# Patient Record
Sex: MALE | Race: WHITE | HISPANIC OR LATINO | URBAN - METROPOLITAN AREA
[De-identification: names, ages, dates, MRNs, and addresses within clinical notes are randomized per-mention and may not be internally consistent; named-entity substitution may affect disease eponyms.]

---

## 2018-05-08 ENCOUNTER — OFFICE (AMBULATORY)
Dept: URBAN - METROPOLITAN AREA CLINIC 75 | Facility: CLINIC | Age: 53
End: 2018-05-08

## 2018-05-08 VITALS
SYSTOLIC BLOOD PRESSURE: 120 MMHG | DIASTOLIC BLOOD PRESSURE: 80 MMHG | HEIGHT: 70 IN | WEIGHT: 218 LBS | HEART RATE: 86 BPM

## 2018-05-08 DIAGNOSIS — K21.9 GASTRO-ESOPHAGEAL REFLUX DISEASE WITHOUT ESOPHAGITIS: ICD-10-CM

## 2018-05-08 DIAGNOSIS — R10.12 LEFT UPPER QUADRANT PAIN: ICD-10-CM

## 2018-05-08 DIAGNOSIS — R09.89 OTHER SPECIFIED SYMPTOMS AND SIGNS INVOLVING THE CIRCULATORY: ICD-10-CM

## 2018-05-08 DIAGNOSIS — Z12.11 ENCOUNTER FOR SCREENING FOR MALIGNANT NEOPLASM OF COLON: ICD-10-CM

## 2018-05-08 PROCEDURE — 99204 OFFICE O/P NEW MOD 45 MIN: CPT | Performed by: INTERNAL MEDICINE

## 2018-05-08 NOTE — SERVICENOTES
The above note was scribed by Cesia Dixon PA-C. Dr. Kline saw this patient and examined this patient while in the office.

## 2018-05-08 NOTE — SERVICEHPINOTES
Thank you for allowing me to participate in the care of this patient.   MARICHUY LUONG  presents in consultation for   reflux symptoms that started years ago. He has symptoms 3 times a month. He reports feeling acid come up in the middle of the night and he has had several choking episodes recently. He feels like food/fluid will go down the "wrong pipe." He takes Omeprazole 40mg QD for this. He also c/o left periumbilical pain that can happen with sitting. It feels like he tore a muscle. Prolonged sitting makes it worse. This has been occurring for years. He is also due a routine colonoscopy. No FHCC, no colon polyps.    BMP Gluc 250, Prot 30, Urobilinogen 2.0, A1C 9.0 CMP gluc 152, otherwise normal

## 2018-06-26 VITALS
SYSTOLIC BLOOD PRESSURE: 116 MMHG | DIASTOLIC BLOOD PRESSURE: 41 MMHG | RESPIRATION RATE: 16 BRPM | HEIGHT: 70 IN | OXYGEN SATURATION: 90 % | SYSTOLIC BLOOD PRESSURE: 142 MMHG | OXYGEN SATURATION: 97 % | HEART RATE: 77 BPM | SYSTOLIC BLOOD PRESSURE: 141 MMHG | TEMPERATURE: 97.5 F | SYSTOLIC BLOOD PRESSURE: 137 MMHG | OXYGEN SATURATION: 89 % | RESPIRATION RATE: 10 BRPM | HEART RATE: 81 BPM | RESPIRATION RATE: 20 BRPM | DIASTOLIC BLOOD PRESSURE: 87 MMHG | SYSTOLIC BLOOD PRESSURE: 113 MMHG | DIASTOLIC BLOOD PRESSURE: 102 MMHG | SYSTOLIC BLOOD PRESSURE: 151 MMHG | DIASTOLIC BLOOD PRESSURE: 81 MMHG | DIASTOLIC BLOOD PRESSURE: 69 MMHG | SYSTOLIC BLOOD PRESSURE: 152 MMHG | RESPIRATION RATE: 19 BRPM | OXYGEN SATURATION: 92 % | SYSTOLIC BLOOD PRESSURE: 121 MMHG | RESPIRATION RATE: 23 BRPM | DIASTOLIC BLOOD PRESSURE: 74 MMHG | HEART RATE: 73 BPM | HEART RATE: 74 BPM | HEART RATE: 75 BPM | DIASTOLIC BLOOD PRESSURE: 84 MMHG | RESPIRATION RATE: 27 BRPM | OXYGEN SATURATION: 93 % | HEART RATE: 79 BPM | DIASTOLIC BLOOD PRESSURE: 88 MMHG | SYSTOLIC BLOOD PRESSURE: 132 MMHG | SYSTOLIC BLOOD PRESSURE: 119 MMHG | DIASTOLIC BLOOD PRESSURE: 76 MMHG | DIASTOLIC BLOOD PRESSURE: 92 MMHG | HEART RATE: 76 BPM | HEART RATE: 87 BPM | WEIGHT: 218 LBS | OXYGEN SATURATION: 99 % | SYSTOLIC BLOOD PRESSURE: 148 MMHG | OXYGEN SATURATION: 88 % | OXYGEN SATURATION: 95 % | DIASTOLIC BLOOD PRESSURE: 103 MMHG | SYSTOLIC BLOOD PRESSURE: 120 MMHG | RESPIRATION RATE: 14 BRPM

## 2018-06-28 ENCOUNTER — AMBULATORY SURGICAL CENTER (AMBULATORY)
Dept: URBAN - METROPOLITAN AREA SURGERY 17 | Facility: SURGERY | Age: 53
End: 2018-06-28
Payer: COMMERCIAL

## 2018-06-28 ENCOUNTER — OFFICE (AMBULATORY)
Dept: URBAN - METROPOLITAN AREA PATHOLOGY 4 | Facility: PATHOLOGY | Age: 53
End: 2018-06-28
Payer: COMMERCIAL

## 2018-06-28 DIAGNOSIS — D12.5 BENIGN NEOPLASM OF SIGMOID COLON: ICD-10-CM

## 2018-06-28 DIAGNOSIS — D12.3 BENIGN NEOPLASM OF TRANSVERSE COLON: ICD-10-CM

## 2018-06-28 DIAGNOSIS — R10.12 LEFT UPPER QUADRANT PAIN: ICD-10-CM

## 2018-06-28 DIAGNOSIS — K22.10 ULCER OF ESOPHAGUS WITHOUT BLEEDING: ICD-10-CM

## 2018-06-28 DIAGNOSIS — Z12.11 ENCOUNTER FOR SCREENING FOR MALIGNANT NEOPLASM OF COLON: ICD-10-CM

## 2018-06-28 DIAGNOSIS — R09.89 OTHER SPECIFIED SYMPTOMS AND SIGNS INVOLVING THE CIRCULATORY: ICD-10-CM

## 2018-06-28 DIAGNOSIS — K21.9 GASTRO-ESOPHAGEAL REFLUX DISEASE WITHOUT ESOPHAGITIS: ICD-10-CM

## 2018-06-28 LAB
GI HISTOLOGY: A. SELECT: (no result)
GI HISTOLOGY: B. SELECT: (no result)
GI HISTOLOGY: C. UNSPECIFIED: (no result)
GI HISTOLOGY: D. UNSPECIFIED: (no result)
GI HISTOLOGY: PDF REPORT: (no result)

## 2018-06-28 PROCEDURE — 43239 EGD BIOPSY SINGLE/MULTIPLE: CPT | Performed by: INTERNAL MEDICINE

## 2018-06-28 PROCEDURE — 88312 SPECIAL STAINS GROUP 1: CPT | Performed by: INTERNAL MEDICINE

## 2018-06-28 PROCEDURE — 45380 COLONOSCOPY AND BIOPSY: CPT | Mod: 33 | Performed by: INTERNAL MEDICINE

## 2018-06-28 PROCEDURE — 88305 TISSUE EXAM BY PATHOLOGIST: CPT | Mod: 33 | Performed by: INTERNAL MEDICINE

## 2018-06-28 RX ORDER — OMEPRAZOLE 40 MG/1
CAPSULE, DELAYED RELEASE ORAL
Qty: 0 | Refills: 0 | Status: COMPLETED
End: 2021-06-17

## 2018-06-28 RX ADMIN — PROPOFOL 50 MG: 10 INJECTION, EMULSION INTRAVENOUS at 10:55

## 2018-06-28 RX ADMIN — LIDOCAINE HYDROCHLORIDE 25 MG: 10 INJECTION, SOLUTION EPIDURAL; INFILTRATION; INTRACAUDAL; PERINEURAL at 10:35

## 2018-06-28 RX ADMIN — PROPOFOL 50 MG: 10 INJECTION, EMULSION INTRAVENOUS at 10:44

## 2018-06-28 RX ADMIN — PROPOFOL 100 MG: 10 INJECTION, EMULSION INTRAVENOUS at 10:35

## 2018-06-28 RX ADMIN — PROPOFOL 50 MG: 10 INJECTION, EMULSION INTRAVENOUS at 10:53

## 2018-06-28 RX ADMIN — PROPOFOL 50 MG: 10 INJECTION, EMULSION INTRAVENOUS at 10:39

## 2018-08-13 ENCOUNTER — OFFICE (AMBULATORY)
Dept: URBAN - METROPOLITAN AREA CLINIC 75 | Facility: CLINIC | Age: 53
End: 2018-08-13

## 2018-08-13 VITALS
WEIGHT: 217 LBS | HEART RATE: 72 BPM | SYSTOLIC BLOOD PRESSURE: 130 MMHG | DIASTOLIC BLOOD PRESSURE: 80 MMHG | HEIGHT: 70 IN

## 2018-08-13 DIAGNOSIS — K22.70 BARRETT'S ESOPHAGUS WITHOUT DYSPLASIA: ICD-10-CM

## 2018-08-13 DIAGNOSIS — K22.10 ULCER OF ESOPHAGUS WITHOUT BLEEDING: ICD-10-CM

## 2018-08-13 DIAGNOSIS — K21.9 GASTRO-ESOPHAGEAL REFLUX DISEASE WITHOUT ESOPHAGITIS: ICD-10-CM

## 2018-08-13 DIAGNOSIS — Z86.010 PERSONAL HISTORY OF COLONIC POLYPS: ICD-10-CM

## 2018-08-13 PROCEDURE — 99213 OFFICE O/P EST LOW 20 MIN: CPT

## 2019-01-02 ENCOUNTER — OFFICE (AMBULATORY)
Dept: URBAN - METROPOLITAN AREA CLINIC 75 | Facility: CLINIC | Age: 54
End: 2019-01-02

## 2019-01-02 VITALS
WEIGHT: 215 LBS | SYSTOLIC BLOOD PRESSURE: 142 MMHG | DIASTOLIC BLOOD PRESSURE: 90 MMHG | HEIGHT: 70 IN | HEART RATE: 84 BPM

## 2019-01-02 DIAGNOSIS — K21.9 GASTRO-ESOPHAGEAL REFLUX DISEASE WITHOUT ESOPHAGITIS: ICD-10-CM

## 2019-01-02 DIAGNOSIS — B37.0 CANDIDAL STOMATITIS: ICD-10-CM

## 2019-01-02 DIAGNOSIS — R11.0 NAUSEA: ICD-10-CM

## 2019-01-02 DIAGNOSIS — K22.70 BARRETT'S ESOPHAGUS WITHOUT DYSPLASIA: ICD-10-CM

## 2019-01-02 PROCEDURE — 99214 OFFICE O/P EST MOD 30 MIN: CPT

## 2019-01-02 RX ORDER — NYSTATIN 100000 [USP'U]/ML
6000 SUSPENSION ORAL
Qty: 900 | Refills: 1 | Status: COMPLETED
Start: 2019-01-02 | End: 2021-06-17

## 2021-06-17 ENCOUNTER — OFFICE (AMBULATORY)
Dept: URBAN - METROPOLITAN AREA CLINIC 75 | Facility: CLINIC | Age: 56
End: 2021-06-17

## 2021-06-17 VITALS — SYSTOLIC BLOOD PRESSURE: 122 MMHG | WEIGHT: 191 LBS | DIASTOLIC BLOOD PRESSURE: 78 MMHG | HEIGHT: 69 IN

## 2021-06-17 DIAGNOSIS — Z86.010 PERSONAL HISTORY OF COLONIC POLYPS: ICD-10-CM

## 2021-06-17 DIAGNOSIS — R13.10 DYSPHAGIA, UNSPECIFIED: ICD-10-CM

## 2021-06-17 DIAGNOSIS — Z12.11 ENCOUNTER FOR SCREENING FOR MALIGNANT NEOPLASM OF COLON: ICD-10-CM

## 2021-06-17 DIAGNOSIS — K21.9 GASTRO-ESOPHAGEAL REFLUX DISEASE WITHOUT ESOPHAGITIS: ICD-10-CM

## 2021-06-17 DIAGNOSIS — K22.70 BARRETT'S ESOPHAGUS WITHOUT DYSPLASIA: ICD-10-CM

## 2021-06-17 PROCEDURE — 99214 OFFICE O/P EST MOD 30 MIN: CPT | Performed by: NURSE PRACTITIONER

## 2021-06-17 RX ORDER — OMEPRAZOLE 40 MG/1
40 CAPSULE, DELAYED RELEASE ORAL
Qty: 180 | Refills: 3 | Status: COMPLETED
Start: 2020-09-17 | End: 2023-01-23

## 2023-01-23 ENCOUNTER — OFFICE (AMBULATORY)
Dept: URBAN - METROPOLITAN AREA CLINIC 76 | Facility: CLINIC | Age: 58
End: 2023-01-23

## 2023-01-23 VITALS
HEIGHT: 69 IN | HEART RATE: 85 BPM | OXYGEN SATURATION: 96 % | DIASTOLIC BLOOD PRESSURE: 81 MMHG | WEIGHT: 202 LBS | SYSTOLIC BLOOD PRESSURE: 138 MMHG

## 2023-01-23 DIAGNOSIS — K21.9 GASTRO-ESOPHAGEAL REFLUX DISEASE WITHOUT ESOPHAGITIS: ICD-10-CM

## 2023-01-23 DIAGNOSIS — K22.70 BARRETT'S ESOPHAGUS WITHOUT DYSPLASIA: ICD-10-CM

## 2023-01-23 DIAGNOSIS — R13.10 DYSPHAGIA, UNSPECIFIED: ICD-10-CM

## 2023-01-23 DIAGNOSIS — K22.10 ULCER OF ESOPHAGUS WITHOUT BLEEDING: ICD-10-CM

## 2023-01-23 DIAGNOSIS — Z86.010 PERSONAL HISTORY OF COLONIC POLYPS: ICD-10-CM

## 2023-01-23 PROCEDURE — 99214 OFFICE O/P EST MOD 30 MIN: CPT | Performed by: NURSE PRACTITIONER

## 2023-03-07 VITALS
SYSTOLIC BLOOD PRESSURE: 109 MMHG | DIASTOLIC BLOOD PRESSURE: 82 MMHG | DIASTOLIC BLOOD PRESSURE: 81 MMHG | WEIGHT: 187 LBS | SYSTOLIC BLOOD PRESSURE: 123 MMHG | SYSTOLIC BLOOD PRESSURE: 119 MMHG | DIASTOLIC BLOOD PRESSURE: 75 MMHG | HEART RATE: 81 BPM | OXYGEN SATURATION: 93 % | OXYGEN SATURATION: 97 % | RESPIRATION RATE: 16 BRPM | HEART RATE: 85 BPM | SYSTOLIC BLOOD PRESSURE: 117 MMHG | SYSTOLIC BLOOD PRESSURE: 118 MMHG | HEART RATE: 77 BPM | DIASTOLIC BLOOD PRESSURE: 78 MMHG | HEART RATE: 80 BPM | SYSTOLIC BLOOD PRESSURE: 110 MMHG | SYSTOLIC BLOOD PRESSURE: 131 MMHG | OXYGEN SATURATION: 96 % | HEART RATE: 68 BPM | DIASTOLIC BLOOD PRESSURE: 88 MMHG | DIASTOLIC BLOOD PRESSURE: 74 MMHG | SYSTOLIC BLOOD PRESSURE: 130 MMHG | RESPIRATION RATE: 18 BRPM | DIASTOLIC BLOOD PRESSURE: 73 MMHG | SYSTOLIC BLOOD PRESSURE: 114 MMHG | RESPIRATION RATE: 21 BRPM | HEART RATE: 76 BPM | RESPIRATION RATE: 15 BRPM | DIASTOLIC BLOOD PRESSURE: 65 MMHG | DIASTOLIC BLOOD PRESSURE: 84 MMHG | DIASTOLIC BLOOD PRESSURE: 72 MMHG | RESPIRATION RATE: 12 BRPM | OXYGEN SATURATION: 99 % | SYSTOLIC BLOOD PRESSURE: 146 MMHG | HEART RATE: 72 BPM | DIASTOLIC BLOOD PRESSURE: 89 MMHG | SYSTOLIC BLOOD PRESSURE: 126 MMHG | HEART RATE: 87 BPM | HEIGHT: 69 IN | SYSTOLIC BLOOD PRESSURE: 125 MMHG | RESPIRATION RATE: 13 BRPM | HEART RATE: 79 BPM | SYSTOLIC BLOOD PRESSURE: 115 MMHG | OXYGEN SATURATION: 98 % | HEART RATE: 73 BPM | DIASTOLIC BLOOD PRESSURE: 85 MMHG | SYSTOLIC BLOOD PRESSURE: 102 MMHG | DIASTOLIC BLOOD PRESSURE: 86 MMHG | RESPIRATION RATE: 14 BRPM | HEART RATE: 75 BPM | TEMPERATURE: 97.2 F | RESPIRATION RATE: 9 BRPM | RESPIRATION RATE: 17 BRPM | DIASTOLIC BLOOD PRESSURE: 68 MMHG | TEMPERATURE: 97 F | SYSTOLIC BLOOD PRESSURE: 148 MMHG | SYSTOLIC BLOOD PRESSURE: 95 MMHG

## 2023-03-10 ENCOUNTER — OFFICE (AMBULATORY)
Dept: URBAN - METROPOLITAN AREA PATHOLOGY 4 | Facility: PATHOLOGY | Age: 58
End: 2023-03-10
Payer: COMMERCIAL

## 2023-03-10 ENCOUNTER — AMBULATORY SURGICAL CENTER (AMBULATORY)
Dept: URBAN - METROPOLITAN AREA SURGERY 17 | Facility: SURGERY | Age: 58
End: 2023-03-10
Payer: COMMERCIAL

## 2023-03-10 DIAGNOSIS — K62.1 RECTAL POLYP: ICD-10-CM

## 2023-03-10 DIAGNOSIS — K22.9 DISEASE OF ESOPHAGUS, UNSPECIFIED: ICD-10-CM

## 2023-03-10 DIAGNOSIS — K22.70 BARRETT'S ESOPHAGUS WITHOUT DYSPLASIA: ICD-10-CM

## 2023-03-10 DIAGNOSIS — K63.5 POLYP OF COLON: ICD-10-CM

## 2023-03-10 DIAGNOSIS — Z86.010 PERSONAL HISTORY OF COLONIC POLYPS: ICD-10-CM

## 2023-03-10 DIAGNOSIS — K22.89 OTHER SPECIFIED DISEASE OF ESOPHAGUS: ICD-10-CM

## 2023-03-10 DIAGNOSIS — D12.0 BENIGN NEOPLASM OF CECUM: ICD-10-CM

## 2023-03-10 DIAGNOSIS — K20.80 OTHER ESOPHAGITIS WITHOUT BLEEDING: ICD-10-CM

## 2023-03-10 DIAGNOSIS — K64.8 OTHER HEMORRHOIDS: ICD-10-CM

## 2023-03-10 DIAGNOSIS — K21.9 GASTRO-ESOPHAGEAL REFLUX DISEASE WITHOUT ESOPHAGITIS: ICD-10-CM

## 2023-03-10 LAB
GI HISTOLOGY: A. UNSPECIFIED: (no result)
GI HISTOLOGY: B. UNSPECIFIED: (no result)
GI HISTOLOGY: C. UNSPECIFIED: (no result)
GI HISTOLOGY: D. UNSPECIFIED: (no result)
GI HISTOLOGY: E. UNSPECIFIED: (no result)
GI HISTOLOGY: F. UNSPECIFIED: (no result)
GI HISTOLOGY: PDF REPORT: (no result)

## 2023-03-10 PROCEDURE — 43239 EGD BIOPSY SINGLE/MULTIPLE: CPT | Performed by: INTERNAL MEDICINE

## 2023-03-10 PROCEDURE — 88305 TISSUE EXAM BY PATHOLOGIST: CPT | Performed by: INTERNAL MEDICINE

## 2023-03-10 PROCEDURE — 45385 COLONOSCOPY W/LESION REMOVAL: CPT | Mod: 33 | Performed by: INTERNAL MEDICINE

## 2023-03-10 NOTE — SERVICEHPINOTES
MARICHUY LUONG  is a  57  male   who presents today for a  EGD-Colonoscopy   for   the indications listed below. The updated Patient Profile was reviewed prior to the procedure, in conjunction with the Physical Exam, including medical conditions, surgical procedures, medications, allergies, family history and social history. See Physical Exam time stamp below for date and time of HPI completion.Pre-operatively, I reviewed the indication(s) for the procedure, the risks of the procedure [including but not limited to: unexpected bleeding possibly requiring hospitalization and/or unplanned repeat procedures, perforation possibly requiring surgical treatment, missed lesions and complications of sedation/MAC (also explained by anesthesia staff)]. I have evaluated the patient for risks associated with the planned anesthesia and the procedure to be performed and find the patient an acceptable candidate for IV sedation.Multiple opportunities were provided for any questions or concerns, and all questions were answered satisfactorily before any anesthesia was administered. We will proceed with the planned procedure.br

## 2023-04-24 ENCOUNTER — OFFICE (AMBULATORY)
Dept: URBAN - METROPOLITAN AREA CLINIC 76 | Facility: CLINIC | Age: 58
End: 2023-04-24

## 2023-04-24 VITALS
HEIGHT: 69 IN | OXYGEN SATURATION: 99 % | DIASTOLIC BLOOD PRESSURE: 86 MMHG | SYSTOLIC BLOOD PRESSURE: 140 MMHG | HEART RATE: 78 BPM | WEIGHT: 200 LBS

## 2023-04-24 DIAGNOSIS — Z87.891 PERSONAL HISTORY OF NICOTINE DEPENDENCE: ICD-10-CM

## 2023-04-24 DIAGNOSIS — K21.9 GASTRO-ESOPHAGEAL REFLUX DISEASE WITHOUT ESOPHAGITIS: ICD-10-CM

## 2023-04-24 DIAGNOSIS — Z86.010 PERSONAL HISTORY OF COLONIC POLYPS: ICD-10-CM

## 2023-04-24 DIAGNOSIS — K22.70 BARRETT'S ESOPHAGUS WITHOUT DYSPLASIA: ICD-10-CM

## 2023-04-24 DIAGNOSIS — K22.9 DISEASE OF ESOPHAGUS, UNSPECIFIED: ICD-10-CM

## 2023-04-24 DIAGNOSIS — F10.10 ALCOHOL ABUSE, UNCOMPLICATED: ICD-10-CM

## 2023-04-24 PROCEDURE — 99214 OFFICE O/P EST MOD 30 MIN: CPT | Performed by: NURSE PRACTITIONER

## 2023-04-24 RX ORDER — OMEPRAZOLE 40 MG/1
CAPSULE, DELAYED RELEASE ORAL
Qty: 90 | Refills: 2 | Status: ACTIVE

## 2023-10-12 VITALS
WEIGHT: 200 LBS | SYSTOLIC BLOOD PRESSURE: 145 MMHG | DIASTOLIC BLOOD PRESSURE: 91 MMHG | TEMPERATURE: 97 F | RESPIRATION RATE: 16 BRPM | DIASTOLIC BLOOD PRESSURE: 98 MMHG | DIASTOLIC BLOOD PRESSURE: 83 MMHG | RESPIRATION RATE: 9 BRPM | TEMPERATURE: 97.9 F | OXYGEN SATURATION: 95 % | SYSTOLIC BLOOD PRESSURE: 143 MMHG | OXYGEN SATURATION: 88 % | HEIGHT: 69 IN | DIASTOLIC BLOOD PRESSURE: 87 MMHG | DIASTOLIC BLOOD PRESSURE: 100 MMHG | HEART RATE: 77 BPM | OXYGEN SATURATION: 97 % | HEART RATE: 80 BPM | HEART RATE: 78 BPM | HEART RATE: 74 BPM | RESPIRATION RATE: 18 BRPM | SYSTOLIC BLOOD PRESSURE: 137 MMHG | RESPIRATION RATE: 15 BRPM | SYSTOLIC BLOOD PRESSURE: 142 MMHG | HEART RATE: 83 BPM | OXYGEN SATURATION: 98 % | SYSTOLIC BLOOD PRESSURE: 150 MMHG

## 2023-10-17 ENCOUNTER — AMBULATORY SURGICAL CENTER (AMBULATORY)
Dept: URBAN - METROPOLITAN AREA SURGERY 17 | Facility: SURGERY | Age: 58
End: 2023-10-17
Payer: COMMERCIAL

## 2023-10-17 DIAGNOSIS — Z53.8 PROCEDURE AND TREATMENT NOT CARRIED OUT FOR OTHER REASONS: ICD-10-CM

## 2023-10-17 DIAGNOSIS — K22.9 DISEASE OF ESOPHAGUS, UNSPECIFIED: ICD-10-CM

## 2023-10-17 DIAGNOSIS — K31.89 OTHER DISEASES OF STOMACH AND DUODENUM: ICD-10-CM

## 2023-10-17 PROCEDURE — 43235 EGD DIAGNOSTIC BRUSH WASH: CPT | Mod: 52 | Performed by: INTERNAL MEDICINE

## 2023-11-06 ENCOUNTER — OFFICE (AMBULATORY)
Dept: URBAN - METROPOLITAN AREA CLINIC 76 | Facility: CLINIC | Age: 58
End: 2023-11-06

## 2023-11-06 VITALS
HEIGHT: 69 IN | HEART RATE: 90 BPM | WEIGHT: 201 LBS | SYSTOLIC BLOOD PRESSURE: 147 MMHG | DIASTOLIC BLOOD PRESSURE: 81 MMHG | DIASTOLIC BLOOD PRESSURE: 84 MMHG | SYSTOLIC BLOOD PRESSURE: 122 MMHG | OXYGEN SATURATION: 97 %

## 2023-11-06 DIAGNOSIS — Z86.010 PERSONAL HISTORY OF COLONIC POLYPS: ICD-10-CM

## 2023-11-06 DIAGNOSIS — K21.9 GASTRO-ESOPHAGEAL REFLUX DISEASE WITHOUT ESOPHAGITIS: ICD-10-CM

## 2023-11-06 DIAGNOSIS — K22.9 DISEASE OF ESOPHAGUS, UNSPECIFIED: ICD-10-CM

## 2023-11-06 DIAGNOSIS — K22.70 BARRETT'S ESOPHAGUS WITHOUT DYSPLASIA: ICD-10-CM

## 2023-11-06 PROBLEM — K22.10 ULCER OF ESOPHAGUS WITHOUT BLEEDING: Status: ACTIVE | Noted: 2018-06-28

## 2023-11-06 PROBLEM — K22.89 OTHER SPECIFIED DISEASE OF ESOPHAGUS: Status: ACTIVE | Noted: 2023-03-10

## 2023-11-06 PROBLEM — D12.3 BENIGN NEOPLASM OF TRANSVERSE COLON: Status: ACTIVE | Noted: 2018-06-28

## 2023-11-06 PROBLEM — D12.5 BENIGN NEOPLASM OF SIGMOID COLON: Status: ACTIVE | Noted: 2018-06-28

## 2023-11-06 PROBLEM — K62.1 RECTAL POLYP: Status: ACTIVE | Noted: 2023-03-10

## 2023-11-06 PROBLEM — K63.5 POLYP OF COLON: Status: ACTIVE | Noted: 2023-03-10

## 2023-11-06 PROCEDURE — 99214 OFFICE O/P EST MOD 30 MIN: CPT | Performed by: NURSE PRACTITIONER

## 2023-11-08 ENCOUNTER — TELEPHONE (OUTPATIENT)
Dept: ORTHOPEDIC SURGERY | Facility: HOSPITAL | Age: 58
End: 2023-11-08
Payer: COMMERCIAL

## 2023-11-08 ENCOUNTER — HOSPITAL ENCOUNTER (OUTPATIENT)
Dept: GENERAL RADIOLOGY | Facility: HOSPITAL | Age: 58
Discharge: HOME | End: 2023-11-08
Payer: COMMERCIAL

## 2023-11-08 ENCOUNTER — PRE-ADMISSION TESTING (OUTPATIENT)
Dept: PREADMISSION TESTING | Facility: HOSPITAL | Age: 58
End: 2023-11-08
Payer: COMMERCIAL

## 2023-11-08 VITALS
WEIGHT: 204 LBS | HEART RATE: 96 BPM | BODY MASS INDEX: 29.2 KG/M2 | SYSTOLIC BLOOD PRESSURE: 145 MMHG | RESPIRATION RATE: 20 BRPM | DIASTOLIC BLOOD PRESSURE: 89 MMHG | OXYGEN SATURATION: 97 % | HEIGHT: 70 IN | TEMPERATURE: 97.3 F

## 2023-11-08 LAB
ALBUMIN SERPL-MCNC: 4.4 G/DL (ref 3.5–5.2)
ALBUMIN/GLOB SERPL: 2 G/DL
ALP SERPL-CCNC: 95 U/L (ref 39–117)
ALT SERPL W P-5'-P-CCNC: 16 U/L (ref 1–41)
ANION GAP SERPL CALCULATED.3IONS-SCNC: 11.2 MMOL/L (ref 5–15)
APTT PPP: 33.8 SECONDS (ref 22.7–35.4)
AST SERPL-CCNC: 16 U/L (ref 1–40)
BILIRUB SERPL-MCNC: 0.8 MG/DL (ref 0–1.2)
BILIRUB UR QL STRIP: NEGATIVE
BUN SERPL-MCNC: 15 MG/DL (ref 8–23)
BUN/CREAT SERPL: 16.7 (ref 7–25)
CALCIUM SPEC-SCNC: 9.1 MG/DL (ref 8.6–10.5)
CHLORIDE SERPL-SCNC: 105 MMOL/L (ref 98–107)
CLARITY UR: CLEAR
CO2 SERPL-SCNC: 21.8 MMOL/L (ref 22–29)
COLOR UR: YELLOW
CREAT SERPL-MCNC: 0.9 MG/DL (ref 0.76–1.27)
DEPRECATED RDW RBC AUTO: 48.6 FL (ref 37–54)
EGFRCR SERPLBLD CKD-EPI 2021: 97.8 ML/MIN/1.73
ERYTHROCYTE [DISTWIDTH] IN BLOOD BY AUTOMATED COUNT: 13.5 % (ref 12.3–15.4)
GLOBULIN UR ELPH-MCNC: 2.2 GM/DL
GLUCOSE SERPL-MCNC: 218 MG/DL (ref 65–99)
GLUCOSE UR STRIP-MCNC: ABNORMAL MG/DL
HCT VFR BLD AUTO: 45.9 % (ref 37.5–51)
HGB BLD-MCNC: 16.1 G/DL (ref 13–17.7)
HGB UR QL STRIP.AUTO: NEGATIVE
INR PPP: 1.01 (ref 0.9–1.1)
KETONES UR QL STRIP: NEGATIVE
LEUKOCYTE ESTERASE UR QL STRIP.AUTO: NEGATIVE
MCH RBC QN AUTO: 34.6 PG (ref 26.6–33)
MCHC RBC AUTO-ENTMCNC: 35.1 G/DL (ref 31.5–35.7)
MCV RBC AUTO: 98.7 FL (ref 79–97)
NITRITE UR QL STRIP: NEGATIVE
PH UR STRIP.AUTO: 5.5 [PH] (ref 5–8)
PLATELET # BLD AUTO: 294 10*3/MM3 (ref 140–450)
PMV BLD AUTO: 9.1 FL (ref 6–12)
POTASSIUM SERPL-SCNC: 4 MMOL/L (ref 3.5–5.2)
PROT SERPL-MCNC: 6.6 G/DL (ref 6–8.5)
PROT UR QL STRIP: NEGATIVE
PROTHROMBIN TIME: 13.4 SECONDS (ref 11.7–14.2)
RBC # BLD AUTO: 4.65 10*6/MM3 (ref 4.14–5.8)
SODIUM SERPL-SCNC: 138 MMOL/L (ref 136–145)
SP GR UR STRIP: >=1.03 (ref 1–1.03)
UROBILINOGEN UR QL STRIP: ABNORMAL
WBC NRBC COR # BLD: 6.4 10*3/MM3 (ref 3.4–10.8)

## 2023-11-08 PROCEDURE — 93005 ELECTROCARDIOGRAM TRACING: CPT

## 2023-11-08 PROCEDURE — 80053 COMPREHEN METABOLIC PANEL: CPT

## 2023-11-08 PROCEDURE — 85610 PROTHROMBIN TIME: CPT

## 2023-11-08 PROCEDURE — 36415 COLL VENOUS BLD VENIPUNCTURE: CPT

## 2023-11-08 PROCEDURE — 85730 THROMBOPLASTIN TIME PARTIAL: CPT

## 2023-11-08 PROCEDURE — 81003 URINALYSIS AUTO W/O SCOPE: CPT

## 2023-11-08 PROCEDURE — 71046 X-RAY EXAM CHEST 2 VIEWS: CPT

## 2023-11-08 PROCEDURE — 85027 COMPLETE CBC AUTOMATED: CPT

## 2023-11-08 PROCEDURE — 93010 ELECTROCARDIOGRAM REPORT: CPT | Performed by: INTERNAL MEDICINE

## 2023-11-08 RX ORDER — BUSPIRONE HYDROCHLORIDE 5 MG/1
5 TABLET ORAL 3 TIMES DAILY
COMMUNITY
Start: 2023-10-16

## 2023-11-08 RX ORDER — MONTELUKAST SODIUM 10 MG/1
10 TABLET ORAL EVERY MORNING
COMMUNITY
Start: 2023-10-16

## 2023-11-08 RX ORDER — LOSARTAN POTASSIUM 50 MG/1
50 TABLET ORAL EVERY MORNING
COMMUNITY
Start: 2023-10-16

## 2023-11-08 RX ORDER — SODIUM FLUORIDE 6.1 MG/ML
1 PASTE, DENTIFRICE DENTAL EVERY MORNING
COMMUNITY
Start: 2023-10-17

## 2023-11-08 RX ORDER — GLIPIZIDE 10 MG/1
20 TABLET ORAL EVERY MORNING
COMMUNITY
Start: 2023-10-16

## 2023-11-08 RX ORDER — INSULIN DEGLUDEC INJECTION 100 U/ML
86-89 INJECTION, SOLUTION SUBCUTANEOUS EVERY MORNING
COMMUNITY

## 2023-11-08 RX ORDER — ALPRAZOLAM 1 MG/1
1 TABLET ORAL 2 TIMES DAILY PRN
COMMUNITY
Start: 2023-11-16

## 2023-11-08 RX ORDER — BUDESONIDE AND FORMOTEROL FUMARATE DIHYDRATE 160; 4.5 UG/1; UG/1
2 AEROSOL RESPIRATORY (INHALATION) EVERY 12 HOURS SCHEDULED
COMMUNITY
Start: 2023-06-22

## 2023-11-08 RX ORDER — ACETAMINOPHEN 325 MG/1
650 TABLET ORAL EVERY 6 HOURS PRN
COMMUNITY

## 2023-11-08 RX ORDER — BUPROPION HYDROCHLORIDE 150 MG/1
150 TABLET, EXTENDED RELEASE ORAL 2 TIMES DAILY
COMMUNITY
Start: 2023-10-16

## 2023-11-08 RX ORDER — SALIVA STIMULANT COMB. NO.7
1 GEL (GRAM) MUCOUS MEMBRANE AS NEEDED
COMMUNITY

## 2023-11-08 RX ORDER — OMEPRAZOLE 40 MG/1
1 CAPSULE, DELAYED RELEASE ORAL DAILY
COMMUNITY
Start: 2023-10-29

## 2023-11-08 RX ORDER — EMPAGLIFLOZIN 25 MG/1
25 TABLET, FILM COATED ORAL EVERY MORNING
COMMUNITY
Start: 2023-10-16

## 2023-11-08 RX ORDER — AMLODIPINE BESYLATE 5 MG/1
5 TABLET ORAL EVERY MORNING
COMMUNITY
Start: 2023-10-16

## 2023-11-08 RX ORDER — SIMVASTATIN 20 MG
20 TABLET ORAL EVERY MORNING
COMMUNITY

## 2023-11-08 RX ORDER — AZELASTINE HYDROCHLORIDE 137 UG/1
2 SPRAY, METERED NASAL EVERY OTHER DAY
COMMUNITY
Start: 2023-10-29

## 2023-11-08 RX ORDER — TADALAFIL 20 MG/1
20 TABLET ORAL DAILY
COMMUNITY

## 2023-11-08 RX ORDER — PIOGLITAZONEHYDROCHLORIDE 45 MG/1
45 TABLET ORAL EVERY MORNING
COMMUNITY
Start: 2023-10-16

## 2023-11-08 RX ORDER — LANOLIN ALCOHOL/MO/W.PET/CERES
1000 CREAM (GRAM) TOPICAL DAILY
COMMUNITY

## 2023-11-08 RX ORDER — FLUTICASONE PROPIONATE 50 MCG
2 SPRAY, SUSPENSION (ML) NASAL AS NEEDED
COMMUNITY
Start: 2023-10-16

## 2023-11-08 NOTE — DISCHARGE INSTRUCTIONS
Take the following medications the morning of surgery:      If you are on prescription narcotic pain medication to control your pain you may also take that medication the morning of surgery.    General Instructions:  Do not eat solid food after midnight the night before surgery.  You may drink clear liquids day of surgery but must stop at least one hour before your hospital arrival time.  It is beneficial for you to have a clear drink that contains carbohydrates the day of surgery.  We suggest a 12 to 20 ounce bottle of Gatorade or Powerade for non-diabetic patients or a 12 to 20 ounce bottle of G2 or Powerade Zero for diabetic patients. (Pediatric patients, are not advised to drink a 12 to 20 ounce carbohydrate drink)    Clear liquids are liquids you can see through.  Nothing red in color.     Plain water                               Sports drinks  Sodas                                   Gelatin (Jell-O)  Fruit juices without pulp such as white grape juice and apple juice  Popsicles that contain no fruit or yogurt  Tea or coffee (no cream or milk added)  Gatorade / Powerade  G2 / Powerade Zero    Infants may have breast milk up to four hours before surgery.  Infants drinking formula may drink formula up to six hours before surgery.   Patients who avoid smoking, chewing tobacco and alcohol for 4 weeks prior to surgery have a reduced risk of post-operative complications.  Quit smoking as many days before surgery as you can.  Do not smoke, use chewing tobacco or drink alcohol the day of surgery.   If applicable bring your C-PAP/ BI-PAP machine in with you to preop day of surgery.  Bring any papers given to you in the doctor’s office.  Wear clean comfortable clothes.  Do not wear contact lenses, false eyelashes or make-up.  Bring a case for your glasses.   Bring crutches or walker if applicable.  Remove all piercings.  Leave jewelry and any other valuables at home.  Hair extensions with metal clips must be removed  prior to surgery.  The Pre-Admission Testing nurse will instruct you to bring medications if unable to obtain an accurate list in Pre-Admission Testing.        If you were given a blood bank ID arm band remember to bring it with you the day of surgery.    Day of surgery:11/15/2023   Hospital to call with time of arrival  Your arrival time is approximately two hours before your scheduled surgery time.  Upon arrival, a Pre-op nurse and Anesthesiologist will review your health history, obtain vital signs, and answer questions you may have.  The only belongings needed at this time will be a list of your home medications and if applicable your C-PAP/BI-PAP machine.  A Pre-op nurse will start an IV and you may receive medication in preparation for surgery, including something to help you relax.     Please be aware that surgery does come with discomfort.  We want to make every effort to control your discomfort so please discuss any uncontrolled symptoms with your nurse.   Your doctor will most likely have prescribed pain medications.      If you are going home after surgery you will receive individualized written care instructions before being discharged.  A responsible adult must drive you to and from the hospital on the day of your surgery and stay with you for 24 hours.  Discharge prescriptions can be filled by the hospital pharmacy during regular pharmacy hours.  If you are having surgery late in the day/evening your prescription may be e-prescribed to your pharmacy.  Please verify your pharmacy hours or chose a 24 hour pharmacy to avoid not having access to your prescription because your pharmacy has closed for the day.    If you are staying overnight following surgery, you will be transported to your hospital room following the recovery period.  [facility] has all private rooms.    If you have any questions please call Pre-Admission Testing at [phone].  Deductibles and co-payments are collected  on the day of service. Please be prepared to pay the required co-pay, deductible or deposit on the day of service as defined by your plan.    Call your surgeon immediately if you experience any of the following symptoms:  Sore Throat  Shortness of Breath or difficulty breathing  Cough  Chills  Body soreness or muscle pain  Headache  Fever  New loss of taste or smell  Do not arrive for your surgery ill.  Your procedure will need to be rescheduled to another time.  You will need to call your physician before the day of surgery to avoid any unnecessary exposure to hospital staff as well as other patients.        PREVENTING INFECTION IN SHOULDER SURGICAL SITES     C. acnes is a bacteria that lives deep within follicles and pores of the skin. It is found in large numbers on the skin of the face, axilla (armpit), chest and back and is the primary bacteria to cause a surgical site infection after shoulder surgery.      Use of a Benzoyl Peroxide solution prior to shoulder surgery decreases C. acnes and reduces post-op infections.   Your surgeon has ordered 5% Benzoyl Peroxide wash to be used three times prior to your surgery.     Please read the following instructions carefully and bring this form with you the day of surgery.     General bathing instructions starting two days before your surgery:    Shower using a fresh bar of anti-bacterial soap (such as Dial) and clean washcloth.  Pay special attention to the neck, shoulder and armpit area.   Wash your hair as usual with your regular shampoo.   Rinse hair and body thoroughly with warm water (not hot water) to remove shampoo and residue.   Dry with a clean towel.              Sleep in a clean bed with clean clothing.  Do not allow pets to sleep with you.     For 2 days before surgery, avoid shaving with a razor because the razor can irritate skin and make it easier to develop an infection.    Any areas of open skin can increase the risk of a post-operative wound infection  by allowing bacteria to enter and travel throughout the body.  Notify your surgeon if you have any skin wounds / rashes even if it is not near the expected surgical site.  The area will need assessed to determine if surgery should be delayed until it is healed.      First application of 5% Benzoyl Peroxide Wash two nights before surgery:                                                                Wash neck, shoulder (front, back, side) and armpit   with warm water, rinse and dry - see picture.  Gently wash the same areas with the Benzoyl Peroxide   cleanser going away from the neck for 10-20 seconds.   Work into a full lather and leave on the skin for   2 minutes for greatest effect.  Rinse thoroughly with warm water, not hot water.                     Pat dry with a clean towel.                                                            Wash your hands thoroughly.  Do not apply lotion, powder, perfume or deodorant.   Put on clean clothes.    Second application the night before surgery:  Repeat the above steps.        Third application morning of surgery:  Repeat the above steps.    Due to shoulder pain or decreased range of motion of your shoulder and arm, you may need assistance washing under the arm or the back portion of your shoulder.     Avoid further washing the areas of the skin treated with Benzoyl Peroxide for at least 1 hour.    For your convenience, you may purchase Benzoyl Peroxide at [facility] retail pharmacy.     Warning:  Let your physician know if you are allergic to Benzoyl Peroxide or have very sensitive skin and cannot use it.   Stop using and contact your surgeon if you experience any excessive scaling, itching, swelling, skin irritation or other signs of a reaction.  Keep out of eyes, ears, nose and mouth.  Do not apply to sunburned, irritated or broken area on the skin.  Avoid unwanted problems with bleaching effect by following these tips:  Wash hands after each use.  Avoid contact  with hair, clothing, furnishings or carpeting.  Wear clean, old t-shirt or clothing to bed.   Use clean, old white pillow cases and sheets to avoid discoloring your bed linens.                                                                                                                                                                                                                                                                                   Please complete the checklist below, bring it with you to the hospital                               the day of surgery and give to the Pre-op Nurse     Preoperative Skin Prep Checklist        Patient Name Label             Enter dates and ?  boxes to indicate completed    Surgery Date: _______________ Regular Shower  Benzoyl Peroxide Wash   First Application:  2 days before_______________  (Stop shaving all body parts)           Morning or Evening                        Evening    Second Application:  1 day  before________________        Morning or Evening                          Evening   Third Application:  Day of Surgery______________                           Morning                   Morning

## 2023-11-08 NOTE — TELEPHONE ENCOUNTER
Risk Factor yes no   Age >75  X   BMI <20 >40  X   Patient History     Chronic Pain (2 or more meds/Pain Management)  X   ETOH (more than 3 drinks Daily)  X   Uncontrolled Depression/Bipolar/Schizoaffective Disorder  X   Arrhythmias--  X   Stent placement/MI  X   DVT/PE  X   Pacemaker  X   HTN (uncontrolled or requiring more than 2 medications)  X   CHF/Retained fluids/Edema  X   Stroke with Residual   X   COPD/Asthma X    FLEX--Non-compliant with CPAP  X   Diabetes (on insulin or more than 2 meds)         A1C: X    BPH/Urinary retention (on medication)  X   CKD  X   Home environment and support     Current ambulation status (use of cane, walker, W/C, Multiple falls/weakness)  X   Stairs to enter and throughout home X    Lives Alone  X   Doesn't have support at home  X   Outpatient Screening Assessment    Home needs: (Walker/BSC):  Total Shoulder  ? Steps 3 steps in   BR upstairs, staying in his lounger for the first few days   Caregiver 24-48hrs post-discharge: Wife    Discharge Plan:   Total Shoulder     Prescriptions: Meds to bed    Home medications:   [] Blood thinner/anti-coag therapy--   [] BPH or diuretic--  ? BP meds-- Losartan, Norvasc,   [] Pain/Anti-inflammatories--   DM--Actos, Glipizide, Tresiba, Jardiance  Pre-op Education:  Educate patient on spinal anesthesia/pain control:  ? patient verbalize understanding    Educate patient on hospital course/timeline:  ?  patient verbalize understanding    Joint Care Class:  ?  yes [] no  Notes:

## 2023-11-10 LAB
QT INTERVAL: 398 MS
QTC INTERVAL: 448 MS

## 2023-11-14 NOTE — H&P
Chief Complaint  Followup: Rotator cuff arthropathy of right shoulder  Patient's Care Team  Primary Care Provider: RAINER RANGEL MD: [address] [facility] [address] [facility] [address], [place] [address], Ph [phone], Fax [phone] NPI: [id number]  Patient's Pharmacies  [id number] ([facility]): [address] [place] [address], Ph [phone], Fax [phone]  Vitals  2023-10-05 07:55  Ht: 5 ft 9 in  Allergies  Reviewed Allergies  ANACIN A.F.: Hives (Moderate)   Medications  Reviewed Medications  Accu-Chek Guide test strips  TEST BLOOD SUGAR BEFORE BREAKFAST AND DINNER AND RECORD  06/30/23   filled surescripts  ALPRAZolam 1 mg tablet  TAKE 1 TABLET BY MOUTH TWICE A DAY AS NEEDED FOR PAIN  09/20/23   filled surescripts  amantadine  mg tablet  05/11/23   filled surescripts  amLODIPine 5 mg tablet  TAKE 1 TABLET BY MOUTH 1 TIME EACH DAY.  09/20/23   filled surescripts  Auvi-Q 0.3 mg/0.3 mL injection, auto-injector  INJECT AS NEEDED FOR SEVERE ALLERGIC REACTION INCLUDING ANAPHYLAXIS AS DIRECTED  10/10/22   filled surescripts  azelastine 137 mcg (0.1 %) nasal spray aerosol  SPRAY TWO SPRAYS TO EACH NOSTRIL TWICE DAILY  08/14/23   filled surescripts  buPROPion HCL  mg tablet,12 hr sustained-release  TAKE 1 TABLET (150 MG TOTAL) BY MOUTH TWICE A DAY DO NOT CRUSH,CHEW,OR SPIT  09/21/23   filled surescripts  fluconazole 100 mg tablet  TAKE 1 TABLET (100 MG TOTAL) BY MOUTH 1 (ONE) TIME EACH DAY FOR 14 DAYS.  11/07/22   filled surescripts  fluticasone propionate 50 mcg/actuation nasal spray,suspension  PLEASE SEE ATTACHED FOR DETAILED DIRECTIONS  08/19/23   filled surescripts  glipiZIDE 10 mg tablet  TAKE 2 TABLETS BY MOUTH 1 TIME EACH DAY.  09/05/23   filled surescripts  Jardiance 25 mg tablet  TAKE 1 TABLET BY MOUTH 1 TIME EACH DAY.  07/21/23   filled surescripts  losartan 50 mg tablet  TAKE 1 TABLET BY MOUTH 1 TIME EACH DAY.  09/11/23   filled surescriijeoma  montelukast 10 mg tablet  TAKE 1 TABLET BY MOUTH  EVERY NIGHT  09/21/23   filled surescripts  omeprazole 40 mg capsule,delayed release  TAKE 1 CAPSULE BY MOUTH TWICE A DAY  10/03/23   filled surescripts  pioglitazone 45 mg tablet  TAKE 1 TABLET BY MOUTH 1 TIME EACH DAY.  09/10/23   filled surescripts  Plenvu 140 gram-9 gram-5.2 gram powder packs  TAKE AS DIRECTED PER INSTRUCTIONS GIVEN TO YOU FOR YOUR COLONOSCOPY  02/22/23   filled surescripts  sertraline 50 mg tablet  TAKE 1 TABLET BY MOUTH 1 TIME EACH DAY.  09/05/23   filled surescripts  simvastatin  05/16/23   entered Micki Loera  Sodium Fluoride 5000 Dry Mouth 1.1 % dental paste  USE ONCE DAILY IN PLACE OF TOOTHPASTE BEFORE BEDTIME  09/12/23   filled surescripts  Symbicort 160 mcg-4.5 mcg/actuation HFA aerosol inhaler  PLEASE SEE ATTACHED FOR DETAILED DIRECTIONS  09/21/23   filled surescripts  Tresiba FlexTouch U-200 insulin 200 unit/mL (3 mL) subcutaneous pen  INJECT 86 UNITS UNDER THE SKIN EVERY NIGHT FOR 90 DOSES.  08/19/23   filled surescripts  Vitamin B-12 1,000 mcg tablet  TAKE 1 TABLET (1,000 MCG TOTAL) BY MOUTH ONCE DAILY..OTC NOT COVERED  08/14/23   filled surescripts  Vaccines  Reviewed Vaccines  Vaccine Type Date Amt. Route Site NDC Lot # Mfr. Exp.  Date VIS VIS  Given Vaccinator  Influenza  influenza 11/01/22            Problems  Reviewed Problems  Localized, primary osteoarthritis of the shoulder region - Onset: 05/16/2023  Pain of right shoulder joint - Onset: 05/16/2023  Rotator cuff arthropathy of right shoulder - Onset: 05/30/2023  Osteoarthritis of joint of right shoulder region - Onset: 07/11/2023  Family History  Reviewed Family History  Mother - Diabetes mellitus  Paternal Grandfather - Diabetes mellitus  Father - Family history of Hypertension  Social History  Reviewed Social History  Public Health and Travel  Have you recently traveled abroad?: No  Have you been to an area known to be high risk for COVID-19?: No  In the 14 days before symptom onset, have you had close contact with a  laboratory-confirmed COVID-19 while that case was ill?: No  In the 14 days before symptom onset, have you had close contact with a person who is under investigation for COVID-19 while that person was ill?: No  Substance Use  Do you or have you ever smoked tobacco?: Never smoker  How much tobacco do you smoke?: None  How many years have you smoked tobacco?: 0  At what age did you start smoking tobacco?: 0  Do you or have you ever used any other forms of tobacco or nicotine?: No  Do you or have you ever used e-cigarettes or vape?: Never used electronic cigarettes  How many years have you used e-cigarettes or vape?: 0  Do you or have you ever used smokeless tobacco?: Never used smokeless tobacco  How many years have you used smokeless tobacco?: 0  How much tobacco do you chew?: none  Has tobacco cessation counseling been provided?: No  What is your level of alcohol consumption?: Moderate  How many times per week do you consume alcohol?: 3-4 times per week  Do you use any illicit or recreational drugs?: No  Have you used IV drugs?: No  How many years have you used illicit or recreational drugs?: 0  Advance Directive  Do you have an advance directive?: Yes  Do you have a medical power of ?: Yes  Surgical History  Reviewed Surgical History  Shoulder Surgery  Past Medical History  Reviewed Past Medical History  Anxiety Disorder: Y  Asthma: Y  Depression: Y  Diabetes: Y  Hypertension: Y  Neuropathy: Y  HPI  Patient is here for continuing problems of his right shoulder. His last injection was around 5 months ago. He received relief for couple months and the pain began to return. At this time he has very limited active range of motion and he is having difficulty performing simple activities of daily living. He has difficulty sleeping because of the right shoulder pain. He is having more painful popping with active motion than previously  ROS  Patient reports arthralgias/joint pain; rt shoulder discuss sx..  Physical  Exam  Right shoulder was examined. Skin shows healed arthroscopic portals. There is no atrophy. There is no effusion. There is no warmth. No erythema. Lymphadenopathy is negative.  Shoulder active ROM today shows:  Elevation = 110;  ER(side) = 30;  ER(abd) = 60;  IR(abd) = 45;  IR(vert) = to waist;  Abduction = 70.  Shoulder strength: Elevation = 3/5; ER = 3+/5; IR = 4/5; ABD = 3+/5.  Neer test is positive. Ta test is positive. Arc of motion is positive. Empty can test was positive. Pulses are normal. Normal sensation. Capillary refill is normal.  Assessment / Plan  1. Rotator cuff arthropathy of right shoulder  M25.811: Other specified joint disorders, right shoulder    Discussion Notes  He has now had multiple injections over the last few years with no significant long-term improvement. His x-rays show severe arthritic change and the MRI shows a chronic retear of his rotator cuff. Considering the severity of his symptoms I suggest we proceed with right reverse total shoulder arthroplasty. We discussed the procedure in detail. We discussed the risk of the surgery that include infection, nerve injury, instability or failure of the implant requiring further surgery amongst other possible complications. We discussed the need for physical therapy and the lengthy recovery. For this procedure. All questions were answered and informed consent was obtained. He would like to schedule for sometime in late November.

## 2023-11-15 ENCOUNTER — APPOINTMENT (OUTPATIENT)
Dept: GENERAL RADIOLOGY | Facility: HOSPITAL | Age: 58
End: 2023-11-15
Payer: COMMERCIAL

## 2023-11-15 ENCOUNTER — HOSPITAL ENCOUNTER (OUTPATIENT)
Facility: HOSPITAL | Age: 58
Setting detail: HOSPITAL OUTPATIENT SURGERY
Discharge: HOME | End: 2023-11-15
Attending: ORTHOPAEDIC SURGERY | Admitting: ORTHOPAEDIC SURGERY
Payer: COMMERCIAL

## 2023-11-15 ENCOUNTER — ANESTHESIA (OUTPATIENT)
Dept: PERIOP | Facility: HOSPITAL | Age: 58
End: 2023-11-15
Payer: COMMERCIAL

## 2023-11-15 ENCOUNTER — ANESTHESIA EVENT (OUTPATIENT)
Dept: PERIOP | Facility: HOSPITAL | Age: 58
End: 2023-11-15
Payer: COMMERCIAL

## 2023-11-15 VITALS
SYSTOLIC BLOOD PRESSURE: 129 MMHG | TEMPERATURE: 98.5 F | RESPIRATION RATE: 16 BRPM | OXYGEN SATURATION: 97 % | HEART RATE: 77 BPM | DIASTOLIC BLOOD PRESSURE: 77 MMHG

## 2023-11-15 LAB
GLUCOSE BLDC GLUCOMTR-MCNC: 163 MG/DL (ref 70–130)
GLUCOSE BLDC GLUCOMTR-MCNC: 176 MG/DL (ref 70–130)

## 2023-11-15 PROCEDURE — C1776 JOINT DEVICE (IMPLANTABLE): HCPCS | Performed by: ORTHOPAEDIC SURGERY

## 2023-11-15 PROCEDURE — 25810000003 LACTATED RINGERS PER 1000 ML: Performed by: ANESTHESIOLOGY

## 2023-11-15 PROCEDURE — 25010000002 ROPIVACAINE PER 1 MG: Performed by: ANESTHESIOLOGY

## 2023-11-15 PROCEDURE — C1713 ANCHOR/SCREW BN/BN,TIS/BN: HCPCS | Performed by: ORTHOPAEDIC SURGERY

## 2023-11-15 PROCEDURE — 25010000002 ONDANSETRON PER 1 MG: Performed by: NURSE ANESTHETIST, CERTIFIED REGISTERED

## 2023-11-15 PROCEDURE — 25810000003 SODIUM CHLORIDE PER 500 ML: Performed by: ORTHOPAEDIC SURGERY

## 2023-11-15 PROCEDURE — 25010000002 PROPOFOL 200 MG/20ML EMULSION: Performed by: NURSE ANESTHETIST, CERTIFIED REGISTERED

## 2023-11-15 PROCEDURE — 25010000002 FENTANYL CITRATE (PF) 50 MCG/ML SOLUTION: Performed by: ANESTHESIOLOGY

## 2023-11-15 PROCEDURE — 25010000002 CEFAZOLIN IN DEXTROSE 2-4 GM/100ML-% SOLUTION: Performed by: ORTHOPAEDIC SURGERY

## 2023-11-15 PROCEDURE — 25010000002 NEOSTIGMINE 5 MG/10ML SOLUTION: Performed by: NURSE ANESTHETIST, CERTIFIED REGISTERED

## 2023-11-15 PROCEDURE — 73020 X-RAY EXAM OF SHOULDER: CPT

## 2023-11-15 PROCEDURE — 25010000002 MIDAZOLAM PER 1 MG: Performed by: ANESTHESIOLOGY

## 2023-11-15 PROCEDURE — 25010000002 DEXAMETHASONE PER 1 MG: Performed by: ANESTHESIOLOGY

## 2023-11-15 PROCEDURE — 97165 OT EVAL LOW COMPLEX 30 MIN: CPT

## 2023-11-15 PROCEDURE — 82948 REAGENT STRIP/BLOOD GLUCOSE: CPT

## 2023-11-15 PROCEDURE — 25010000002 ESMOLOL 100 MG/10ML SOLUTION: Performed by: NURSE ANESTHETIST, CERTIFIED REGISTERED

## 2023-11-15 PROCEDURE — L3670 SO ACRO/CLAV CAN WEB PRE OTS: HCPCS | Performed by: ORTHOPAEDIC SURGERY

## 2023-11-15 DEVICE — KNOTLESS TISSUE CONTROL DEVICE, UNDYED UNIDIRECTIONAL (ANTIBACTERIAL) SYNTHETIC ABSORBABLE DEVICE
Type: IMPLANTABLE DEVICE | Site: SHOULDER | Status: FUNCTIONAL
Brand: STRATAFIX

## 2023-11-15 DEVICE — IMPLANTABLE DEVICE
Type: IMPLANTABLE DEVICE | Site: SHOULDER | Status: FUNCTIONAL
Brand: COMPREHENSIVE REVERSE SHOULDER

## 2023-11-15 DEVICE — TRY HUM/SHLDR COMPREHENSIVE/REVERSE MINI COCR STD PLS 6MM: Type: IMPLANTABLE DEVICE | Site: SHOULDER | Status: FUNCTIONAL

## 2023-11-15 DEVICE — IMPLANTABLE DEVICE
Type: IMPLANTABLE DEVICE | Site: SHOULDER | Status: FUNCTIONAL
Brand: COMPREHENSIVE® REVERSE SHOULDER

## 2023-11-15 DEVICE — IMPLANTABLE DEVICE
Type: IMPLANTABLE DEVICE | Site: SHOULDER | Status: FUNCTIONAL
Brand: COMPREHENSIVE® PROLONG®

## 2023-11-15 DEVICE — IMPLANTABLE DEVICE
Type: IMPLANTABLE DEVICE | Site: SHOULDER | Status: FUNCTIONAL
Brand: COMPREHENSIVE® SHOULDER SYSTEM

## 2023-11-15 DEVICE — TOTL SHLDER REV: Type: IMPLANTABLE DEVICE | Status: FUNCTIONAL

## 2023-11-15 RX ORDER — FENTANYL CITRATE 50 UG/ML
50 INJECTION, SOLUTION INTRAMUSCULAR; INTRAVENOUS
Status: DISCONTINUED | OUTPATIENT
Start: 2023-11-15 | End: 2023-11-15 | Stop reason: HOSPADM

## 2023-11-15 RX ORDER — SODIUM CHLORIDE 0.9 % (FLUSH) 0.9 %
3 SYRINGE (ML) INJECTION EVERY 12 HOURS SCHEDULED
Status: DISCONTINUED | OUTPATIENT
Start: 2023-11-15 | End: 2023-11-15 | Stop reason: HOSPADM

## 2023-11-15 RX ORDER — FLUMAZENIL 0.1 MG/ML
0.2 INJECTION INTRAVENOUS AS NEEDED
Status: DISCONTINUED | OUTPATIENT
Start: 2023-11-15 | End: 2023-11-15 | Stop reason: HOSPADM

## 2023-11-15 RX ORDER — PROPOFOL 10 MG/ML
INJECTION, EMULSION INTRAVENOUS AS NEEDED
Status: DISCONTINUED | OUTPATIENT
Start: 2023-11-15 | End: 2023-11-15 | Stop reason: SURG

## 2023-11-15 RX ORDER — HYDRALAZINE HYDROCHLORIDE 20 MG/ML
5 INJECTION INTRAMUSCULAR; INTRAVENOUS
Status: DISCONTINUED | OUTPATIENT
Start: 2023-11-15 | End: 2023-11-15 | Stop reason: HOSPADM

## 2023-11-15 RX ORDER — MAGNESIUM HYDROXIDE 1200 MG/15ML
LIQUID ORAL AS NEEDED
Status: DISCONTINUED | OUTPATIENT
Start: 2023-11-15 | End: 2023-11-15 | Stop reason: HOSPADM

## 2023-11-15 RX ORDER — DROPERIDOL 2.5 MG/ML
0.62 INJECTION, SOLUTION INTRAMUSCULAR; INTRAVENOUS
Status: DISCONTINUED | OUTPATIENT
Start: 2023-11-15 | End: 2023-11-15 | Stop reason: HOSPADM

## 2023-11-15 RX ORDER — GLYCOPYRROLATE 0.2 MG/ML
INJECTION INTRAMUSCULAR; INTRAVENOUS AS NEEDED
Status: DISCONTINUED | OUTPATIENT
Start: 2023-11-15 | End: 2023-11-15 | Stop reason: SURG

## 2023-11-15 RX ORDER — FAMOTIDINE 10 MG/ML
20 INJECTION, SOLUTION INTRAVENOUS ONCE
Status: COMPLETED | OUTPATIENT
Start: 2023-11-15 | End: 2023-11-15

## 2023-11-15 RX ORDER — ROCURONIUM BROMIDE 10 MG/ML
INJECTION, SOLUTION INTRAVENOUS AS NEEDED
Status: DISCONTINUED | OUTPATIENT
Start: 2023-11-15 | End: 2023-11-15 | Stop reason: SURG

## 2023-11-15 RX ORDER — OXYCODONE AND ACETAMINOPHEN 7.5; 325 MG/1; MG/1
1 TABLET ORAL EVERY 4 HOURS PRN
Status: DISCONTINUED | OUTPATIENT
Start: 2023-11-15 | End: 2023-11-15 | Stop reason: HOSPADM

## 2023-11-15 RX ORDER — FENTANYL CITRATE 50 UG/ML
50 INJECTION, SOLUTION INTRAMUSCULAR; INTRAVENOUS ONCE AS NEEDED
Status: COMPLETED | OUTPATIENT
Start: 2023-11-15 | End: 2023-11-15

## 2023-11-15 RX ORDER — HYDROMORPHONE HYDROCHLORIDE 1 MG/ML
0.5 INJECTION, SOLUTION INTRAMUSCULAR; INTRAVENOUS; SUBCUTANEOUS
Status: DISCONTINUED | OUTPATIENT
Start: 2023-11-15 | End: 2023-11-15 | Stop reason: HOSPADM

## 2023-11-15 RX ORDER — DEXAMETHASONE SODIUM PHOSPHATE 4 MG/ML
INJECTION, SOLUTION INTRA-ARTICULAR; INTRALESIONAL; INTRAMUSCULAR; INTRAVENOUS; SOFT TISSUE
Status: COMPLETED | OUTPATIENT
Start: 2023-11-15 | End: 2023-11-15

## 2023-11-15 RX ORDER — NEOSTIGMINE METHYLSULFATE 0.5 MG/ML
INJECTION, SOLUTION INTRAVENOUS AS NEEDED
Status: DISCONTINUED | OUTPATIENT
Start: 2023-11-15 | End: 2023-11-15 | Stop reason: SURG

## 2023-11-15 RX ORDER — LIDOCAINE HYDROCHLORIDE 20 MG/ML
INJECTION, SOLUTION EPIDURAL; INFILTRATION; INTRACAUDAL; PERINEURAL AS NEEDED
Status: DISCONTINUED | OUTPATIENT
Start: 2023-11-15 | End: 2023-11-15 | Stop reason: SURG

## 2023-11-15 RX ORDER — OXYCODONE HYDROCHLORIDE AND ACETAMINOPHEN 5; 325 MG/1; MG/1
1 TABLET ORAL EVERY 4 HOURS PRN
Qty: 40 TABLET | Refills: 0 | Status: SHIPPED | OUTPATIENT
Start: 2023-11-15

## 2023-11-15 RX ORDER — SODIUM CHLORIDE, SODIUM LACTATE, POTASSIUM CHLORIDE, CALCIUM CHLORIDE 600; 310; 30; 20 MG/100ML; MG/100ML; MG/100ML; MG/100ML
9 INJECTION, SOLUTION INTRAVENOUS CONTINUOUS
Status: DISCONTINUED | OUTPATIENT
Start: 2023-11-15 | End: 2023-11-15 | Stop reason: HOSPADM

## 2023-11-15 RX ORDER — ESMOLOL HYDROCHLORIDE 10 MG/ML
INJECTION INTRAVENOUS AS NEEDED
Status: DISCONTINUED | OUTPATIENT
Start: 2023-11-15 | End: 2023-11-15 | Stop reason: SURG

## 2023-11-15 RX ORDER — EPHEDRINE SULFATE 50 MG/ML
5 INJECTION, SOLUTION INTRAVENOUS ONCE AS NEEDED
Status: DISCONTINUED | OUTPATIENT
Start: 2023-11-15 | End: 2023-11-15 | Stop reason: HOSPADM

## 2023-11-15 RX ORDER — IPRATROPIUM BROMIDE AND ALBUTEROL SULFATE 2.5; .5 MG/3ML; MG/3ML
3 SOLUTION RESPIRATORY (INHALATION) ONCE AS NEEDED
Status: DISCONTINUED | OUTPATIENT
Start: 2023-11-15 | End: 2023-11-15 | Stop reason: HOSPADM

## 2023-11-15 RX ORDER — ONDANSETRON 2 MG/ML
4 INJECTION INTRAMUSCULAR; INTRAVENOUS ONCE AS NEEDED
Status: DISCONTINUED | OUTPATIENT
Start: 2023-11-15 | End: 2023-11-15 | Stop reason: HOSPADM

## 2023-11-15 RX ORDER — LABETALOL HYDROCHLORIDE 5 MG/ML
5 INJECTION, SOLUTION INTRAVENOUS
Status: DISCONTINUED | OUTPATIENT
Start: 2023-11-15 | End: 2023-11-15 | Stop reason: HOSPADM

## 2023-11-15 RX ORDER — ONDANSETRON 2 MG/ML
INJECTION INTRAMUSCULAR; INTRAVENOUS AS NEEDED
Status: DISCONTINUED | OUTPATIENT
Start: 2023-11-15 | End: 2023-11-15 | Stop reason: SURG

## 2023-11-15 RX ORDER — PROMETHAZINE HYDROCHLORIDE 25 MG/1
25 TABLET ORAL ONCE AS NEEDED
Status: DISCONTINUED | OUTPATIENT
Start: 2023-11-15 | End: 2023-11-15 | Stop reason: HOSPADM

## 2023-11-15 RX ORDER — NALOXONE HCL 0.4 MG/ML
0.2 VIAL (ML) INJECTION AS NEEDED
Status: DISCONTINUED | OUTPATIENT
Start: 2023-11-15 | End: 2023-11-15 | Stop reason: HOSPADM

## 2023-11-15 RX ORDER — PHENYLEPHRINE HCL IN 0.9% NACL 1 MG/10 ML
SYRINGE (ML) INTRAVENOUS AS NEEDED
Status: DISCONTINUED | OUTPATIENT
Start: 2023-11-15 | End: 2023-11-15 | Stop reason: SURG

## 2023-11-15 RX ORDER — ROPIVACAINE HYDROCHLORIDE 5 MG/ML
INJECTION, SOLUTION EPIDURAL; INFILTRATION; PERINEURAL
Status: COMPLETED | OUTPATIENT
Start: 2023-11-15 | End: 2023-11-15

## 2023-11-15 RX ORDER — SODIUM CHLORIDE 0.9 % (FLUSH) 0.9 %
3-10 SYRINGE (ML) INJECTION AS NEEDED
Status: DISCONTINUED | OUTPATIENT
Start: 2023-11-15 | End: 2023-11-15 | Stop reason: HOSPADM

## 2023-11-15 RX ORDER — CEFAZOLIN SODIUM 2 G/100ML
2000 INJECTION, SOLUTION INTRAVENOUS ONCE
Status: COMPLETED | OUTPATIENT
Start: 2023-11-15 | End: 2023-11-15

## 2023-11-15 RX ORDER — PROMETHAZINE HYDROCHLORIDE 25 MG/1
25 SUPPOSITORY RECTAL ONCE AS NEEDED
Status: DISCONTINUED | OUTPATIENT
Start: 2023-11-15 | End: 2023-11-15 | Stop reason: HOSPADM

## 2023-11-15 RX ORDER — MIDAZOLAM HYDROCHLORIDE 1 MG/ML
1 INJECTION INTRAMUSCULAR; INTRAVENOUS
Status: DISCONTINUED | OUTPATIENT
Start: 2023-11-15 | End: 2023-11-15 | Stop reason: HOSPADM

## 2023-11-15 RX ORDER — DIPHENHYDRAMINE HYDROCHLORIDE 50 MG/ML
12.5 INJECTION INTRAMUSCULAR; INTRAVENOUS
Status: DISCONTINUED | OUTPATIENT
Start: 2023-11-15 | End: 2023-11-15 | Stop reason: HOSPADM

## 2023-11-15 RX ORDER — LIDOCAINE HYDROCHLORIDE 10 MG/ML
0.5 INJECTION, SOLUTION INFILTRATION; PERINEURAL ONCE AS NEEDED
Status: COMPLETED | OUTPATIENT
Start: 2023-11-15 | End: 2023-11-15

## 2023-11-15 RX ORDER — SODIUM CHLORIDE 9 MG/ML
INJECTION, SOLUTION INTRAVENOUS AS NEEDED
Status: DISCONTINUED | OUTPATIENT
Start: 2023-11-15 | End: 2023-11-15 | Stop reason: HOSPADM

## 2023-11-15 RX ORDER — HYDROCODONE BITARTRATE AND ACETAMINOPHEN 5; 325 MG/1; MG/1
1 TABLET ORAL ONCE AS NEEDED
Status: DISCONTINUED | OUTPATIENT
Start: 2023-11-15 | End: 2023-11-15 | Stop reason: HOSPADM

## 2023-11-15 RX ORDER — DEXAMETHASONE SODIUM PHOSPHATE 4 MG/ML
INJECTION, SOLUTION INTRA-ARTICULAR; INTRALESIONAL; INTRAMUSCULAR; INTRAVENOUS; SOFT TISSUE AS NEEDED
Status: DISCONTINUED | OUTPATIENT
Start: 2023-11-15 | End: 2023-11-15 | Stop reason: SURG

## 2023-11-15 RX ADMIN — Medication 100 MCG: at 08:11

## 2023-11-15 RX ADMIN — LIDOCAINE HYDROCHLORIDE 0.5 ML: 10 INJECTION, SOLUTION EPIDURAL; INFILTRATION; INTRACAUDAL; PERINEURAL at 06:01

## 2023-11-15 RX ADMIN — ROCURONIUM BROMIDE 50 MG: 10 INJECTION, SOLUTION INTRAVENOUS at 07:08

## 2023-11-15 RX ADMIN — SODIUM CHLORIDE, POTASSIUM CHLORIDE, SODIUM LACTATE AND CALCIUM CHLORIDE 9 ML/HR: 600; 310; 30; 20 INJECTION, SOLUTION INTRAVENOUS at 06:01

## 2023-11-15 RX ADMIN — CEFAZOLIN SODIUM 2000 MG: 2 INJECTION, SOLUTION INTRAVENOUS at 06:58

## 2023-11-15 RX ADMIN — FAMOTIDINE 20 MG: 10 INJECTION INTRAVENOUS at 06:05

## 2023-11-15 RX ADMIN — ROPIVACAINE HYDROCHLORIDE 20 ML: 5 INJECTION EPIDURAL; INFILTRATION; PERINEURAL at 06:16

## 2023-11-15 RX ADMIN — Medication 150 MCG: at 08:42

## 2023-11-15 RX ADMIN — DEXAMETHASONE SODIUM PHOSPHATE 6 MG: 4 INJECTION, SOLUTION INTRA-ARTICULAR; INTRALESIONAL; INTRAMUSCULAR; INTRAVENOUS; SOFT TISSUE at 07:18

## 2023-11-15 RX ADMIN — Medication 100 MCG: at 08:04

## 2023-11-15 RX ADMIN — GLYCOPYRROLATE 0.2 MG: 0.2 INJECTION INTRAMUSCULAR; INTRAVENOUS at 08:50

## 2023-11-15 RX ADMIN — Medication 100 MCG: at 07:58

## 2023-11-15 RX ADMIN — MIDAZOLAM 1 MG: 1 INJECTION INTRAMUSCULAR; INTRAVENOUS at 06:12

## 2023-11-15 RX ADMIN — Medication 150 MCG: at 08:32

## 2023-11-15 RX ADMIN — ONDANSETRON 4 MG: 2 INJECTION INTRAMUSCULAR; INTRAVENOUS at 08:46

## 2023-11-15 RX ADMIN — NEOSTIGMINE METHYLSULFATE 1.5 MG: 0.5 INJECTION INTRAVENOUS at 08:50

## 2023-11-15 RX ADMIN — LIDOCAINE HYDROCHLORIDE 100 MG: 20 INJECTION, SOLUTION EPIDURAL; INFILTRATION; INTRACAUDAL; PERINEURAL at 07:08

## 2023-11-15 RX ADMIN — PROPOFOL 200 MG: 10 INJECTION, EMULSION INTRAVENOUS at 07:08

## 2023-11-15 RX ADMIN — DEXAMETHASONE SODIUM PHOSPHATE 4 MG: 4 INJECTION, SOLUTION INTRA-ARTICULAR; INTRALESIONAL; INTRAMUSCULAR; INTRAVENOUS; SOFT TISSUE at 06:16

## 2023-11-15 RX ADMIN — ESMOLOL HYDROCHLORIDE 50 MG: 10 INJECTION, SOLUTION INTRAVENOUS at 07:08

## 2023-11-15 RX ADMIN — FENTANYL CITRATE 50 MCG: 50 INJECTION, SOLUTION INTRAMUSCULAR; INTRAVENOUS at 06:12

## 2023-11-15 NOTE — ANESTHESIA POSTPROCEDURE EVALUATION
Patient: [place]    Procedure Summary       Date: 11/15/23 Room / Location:  [facility] [address] [place] [address] [facility]    Anesthesia Start: 0700 Anesthesia Stop: 0905    Procedure: TOTAL SHOULDER REVERSE ARTHROPLASTY (Right: Shoulder) Diagnosis:     Surgeons: Marlon Gordon MD Provider: Isaac Cid MD    Anesthesia Type: general ASA Status: 2            Anesthesia Type: general    Vitals  Vitals Value Taken Time   /73 11/15/23 1015   Temp 36.9 °C (98.5 °F) 11/15/23 0903   Pulse 70 11/15/23 1022   Resp 16 11/15/23 0945   SpO2 94 % 11/15/23 1022   Vitals shown include unfiled device data.        Post Anesthesia Care and Evaluation    Patient location during evaluation: bedside  Patient participation: complete - patient participated  Level of consciousness: awake and alert  Pain management: adequate    Airway patency: patent  Anesthetic complications: No anesthetic complications    Cardiovascular status: acceptable  Respiratory status: acceptable  Hydration status: acceptable    Comments: /79 (BP Location: Left arm, Patient Position: Lying)   Pulse 78   Temp 36.9 °C (98.5 °F) (Oral)   Resp 16   SpO2 95%

## 2023-11-15 NOTE — OP NOTE
TOTAL SHOULDER REVERSE ARTHROPLASTY  Procedure Note    Bay Ga  11/15/2023    Pre-op Diagnosis:   1.  Right rotator cuff arthropathy  2.   3.     Post-op Diagnosis:     1.  Same  2.   3.     Procedure(s):  1.  Right reverse total shoulder arthroplasty  2.   3.   4.     Surgeon(s):  Marlon Gordon MD    Anesthesia: General with Block  Anesthesiologist: Isaac Cid MD  CRNA: Carol Chiang CRNA    Staff:   Circulator: Tabitha Leggett RN  Scrub Person: Annelise Morrison PCT  Vendor Representative: Bennie Altman  Assistant: Minh Lisa PA-C      Drains: None    Estimated Blood Loss: minimal    Specimen: * No orders in the log *    Description of Procedure: Following induction of anesthesia the patient was placed in the beachchair position.  The right shoulder was then prepped and draped in a sterile fashion.  I marked the skin for deltopectoral approach and cover the skin with Ioban.  I incised beginning just proximal and lateral to the coracoid process toward the axillary fold.  Subcutaneous tissue was bluntly dissected exposing the deltopectoral interval.  The cephalic vein and deltoid were retracted laterally while the pectoralis was retracted medially.  I released the superior 2 cm of the pectoralis tendon.  The clavipectoral fascia was incised and the strap muscles were retracted medially.  The coracohumeral ligament was then sharply released.  The humeral head was found to be high riding and in contact with the coracoid process.  I used the cautery to perform a T-shaped capsulotomy releasing the superior scarred capsule and the anterior remnant of the subscapularis.  He was found to have a large inferior humeral spur.  I then released the capsule inferiorly allowing us to actually rotate the articular surface into the operative field.  I then placed the cutting guide for the Bi[employer] comprehensive system against the anterior cortex of the humerus and pinned it in place.  We made our  proximal humeral cut with the oscillating saw.  I used the rongeur to remove a large amount of peripheral osteophytes.  We then prepared the proximal humeral canal for implantation of the stem.  Using first the T-handled awl and then broaches we found that the size 12 mini comprehensive broach gave the best fit of the surrounding bone.  The broach was left in place while we address the glenoid.     I placed 2 posterior and 1 anterior glenoid retractor.  He was found to have some mild anterior and superior wear and some scapular protraction but the glenoid was in otherwise good condition.  There was a large anterior and inferior glenoid osteophyte.  I placed the guide for the baseplate into the central portion of the glenoid and placed our Steinmann pin to the appropriate coronal angle into the appropriate depth.  We then overreamed the Steinmann pin then impacted our baseplate into place.  The baseplate was then secured with 1 large central and 4 peripheral locking screws.  I then used the curved osteotome and rongeur to carefully remove a large amount of bone from the inferior glenoid, based on the osteophyte seen on his preop films.    I then performed a trial placement of the glenosphere and found that the C offset position rotated posteriorly gave the best coverage of the glenoid surface.  We then dried the trunnion of the baseplate and impacted our glenosphere into place.  I performed a trial reduction with the trial stem and found that the +6 offset +5 thickness tray with standard bearing gave good range of motion and stability.      We then removed the trial stem, irrigated and dried the proximal humeral canal then impacted our size 12 mini comprehensive stem into place.  I then repeated our trial reduction and again found the +6 offset +5 thickness tray with standard thickness bearing gave good stability and range of motion.  We removed the trial tray, dried the trunnion of the humeral stem then impacted our  tray and bearing into place.      The shoulder was then reduced and extensively irrigated.  We then irrigated and performed closure using 0 Vicryl in a running fashion to close the deltopectoral interval, 2-0 strata fix to close subcutaneous tissue and staples to close the skin.  He was then placed into a soft sterile dressing in his postop sling.  He will be discharged to recovery and then home and his prognosis is good.     Minh Lisa PA-C assisted me in this procedure.  His presence was necessary to help with holding the patient's limb and the retractors.  He allowed me to perform the procedure in a much quicker fashion resulting in less morbidity and risk for the patient.  An extra set of skilled sugical hands was necessary to effectively perform this procedure.     Findings: See Dictation     Complications: None        Marlon Gordon MD    Findings: See Dictation    Complications: None      Marlon Gordon MD     Date: 11/15/2023  Time: 08:44 EST

## 2023-11-15 NOTE — ANESTHESIA PREPROCEDURE EVALUATION
Anesthesia Evaluation                  Airway   Mallampati: II  TM distance: >3 FB  Neck ROM: full  Dental - normal exam     Pulmonary    (+) asthma,sleep apnea  (-) wheezes  Cardiovascular   Exercise tolerance: excellent (>7 METS)    ECG reviewed  Rhythm: regular    (+) hypertension, hyperlipidemia      Neuro/Psych  GI/Hepatic/Renal/Endo    (+) diabetes mellitus    Musculoskeletal     Abdominal    Substance History      OB/GYN          Other                    Anesthesia Plan    ASA 2     general     (  D/W R&B of GA including but not limited to: heart, lung, liver, kidney, neurologic problems, positioning injuries, dental damage, corneal abrasion and TMJ.  .Risks of peripheral nerve block were discussed with patient including but not limited to: inadequate block, bleeding, infection, persistent numbness or weakness, nerve damage, painful dysasthesia and need to protect limb while numb.)  intravenous induction     Anesthetic plan, risks, benefits, and alternatives have been provided, discussed and informed consent has been obtained with: patient.    CODE STATUS:

## 2023-11-15 NOTE — THERAPY EVALUATION
Patient Name: Bay Ga  : 1965    MRN: [id number]                              Today's Date: 11/15/2023       Admit Date: 11/15/2023    Visit Dx: No diagnosis found.  There is no problem list on file for this patient.    Past Medical History:   Diagnosis Date    Anxiety     Arthritis     Asthma     Diabetes mellitus     ED (erectile dysfunction)     History of bronchitis     History of transfusion     Hyperlipidemia     Hypertension     Refusal of blood transfusions as patient is [employer]ism     Sleep apnea     not using machine since losing weight but hasn't been retested     Past Surgical History:   Procedure Laterality Date    COLONOSCOPY      ENDOSCOPY      PATELLA BURSA EXCISION Left     knee    SHOULDER ARTHROSCOPY Right       General Information       Row Name 11/15/23 1250          OT Time and Intention    Document Type evaluation;discharge evaluation/summary  -MW     Mode of Treatment occupational therapy;individual therapy  -       Row Name 11/15/23 1250          General Information    Patient Profile Reviewed yes  -MW     Prior Level of Function independent:  -MW     Existing Precautions/Restrictions right;non-weight bearing;shoulder  -MW     Barriers to Rehab none identified  -       Row Name 11/15/23 1250          Living Environment    People in Home spouse  -       Row Name 11/15/23 1250          Cognition    Orientation Status (Cognition) oriented x 4  -MW       Row Name 11/15/23 1250          Safety Issues, Functional Mobility    Impairments Affecting Function (Mobility) sensation/sensory awareness  -               User Key  (r) = Recorded By, (t) = Taken By, (c) = Cosigned By      Initials Name Provider Type    Bertha Escamilla OT Occupational Therapist                     Mobility/ADL's       Row Name 11/15/23 1250          Bed Mobility    Comment, (Bed Mobility) UIC  -       Row Name 11/15/23 1250          Transfers    Transfers sit-stand transfer  -       Row  Name 11/15/23 1250          Sit-Stand Transfer    Sit-Stand [place] (Transfers) independent  -[facility] Name 11/15/23 1250          Activities of Daily Living    BADL Assessment/Intervention upper body dressing;lower body dressing  -[facility] Name 11/15/23 1250          Mobility    Extremity Weight-bearing Status right upper extremity  -     Right Upper Extremity (Weight-bearing Status) non weight-bearing (NWB)  -[facility] Name 11/15/23 1250          Upper Body Dressing Assessment/Training    Comment, (Upper Body Dressing) don/doffing sling completed and education provided, adaptive dressing with UBD completion, and positioning of sling  -[facility] Name 11/15/23 1250          Lower Body Dressing Assessment/Training    Comment, (Lower Body Dressing) assist with threading, wife able to assist at home  -               User Key  (r) = Recorded By, (t) = Taken By, (c) = Cosigned By      Initials Name Provider Type    Bertha Escamilla OT Occupational Therapist                   Obj/Interventions       [facility] Name 11/15/23 1251          Motor Skills    Therapeutic Exercise --  total shoulder HEP education provided  to pt and spouse, demo'd technique and pt verbalized understanding  -[facility] Name 11/15/23 1251          Balance    Balance Interventions occupation based/functional task  -               User Key  (r) = Recorded By, (t) = Taken By, (c) = Cosigned By      Initials Name Provider Type    Bertha Escamilla OT Occupational Therapist                   Goals/Plan       [facility] Name 11/15/23 1255          Problem Specific Goal 1 (OT)    Problem Specific Goal 1 (OT) (I) total shoulder HEP  -     Time Frame (Problem Specific Goal 1, OT) short term goal (STG);1 day  -MW     Progress/Outcome (Problem Specific Goal 1, OT) goal met  -               User Key  (r) = Recorded By, (t) = Taken By, (c) = Cosigned By      Initials Name Provider Type    Bertha Escamilla OT Occupational  Therapist                   Clinical Impression       Row Name 11/15/23 1251          Pain Assessment    Pretreatment Pain Rating 0/10 - no pain  -MW     Posttreatment Pain Rating 0/10 - no pain  -       Row Name 11/15/23 1251          Plan of Care Review    Plan of Care Reviewed With patient;spouse  -     Progress no change  -     Outcome Evaluation Pt seen in OSC, POD0 for s/p Right reverse total shoulder arthroplasty. RUE in sling, education provided on total shoulder precautions, HEP, don/doffing sling and adaptive dressing during TBD completion. All questions answered and pt verbalized understanding, he plans home with spouse and therapy. Acute OT to sign off.  -       Row Name 11/15/23 1251          Therapy Assessment/Plan (OT)    Criteria for Skilled Therapeutic Interventions Met (OT) no problems identified which require skilled intervention  -     Therapy Frequency (OT) evaluation only  -       Row Name 11/15/23 1251          Therapy Plan Review/Discharge Plan (OT)    Anticipated Discharge Disposition (OT) home;home with assist;home with outpatient therapy services  -[facility] Name 11/15/23 1251          Vital Signs    O2 Delivery Pre Treatment room air  -[facility] Name 11/15/23 1251          Positioning and Restraints    Pre-Treatment Position sitting in chair/recliner  -MW     Post Treatment Position chair  -MW     In Chair notified nsg;sitting;with family/caregiver  -               User Key  (r) = Recorded By, (t) = Taken By, (c) = Cosigned By      Initials Name Provider Type    Bertha Escamilla, NED Occupational Therapist                   Outcome Measures       Row Name 11/15/23 1252          How much help from another is currently needed...    Putting on and taking off regular lower body clothing? 3  -MW     Bathing (including washing, rinsing, and drying) 4  -MW     Toileting (which includes using toilet bed pan or urinal) 4  -MW     Putting on and taking off regular upper body  clothing 3  -MW     Taking care of personal grooming (such as brushing teeth) 4  -MW     Eating meals 4  -MW     AM-PAC 6 Clicks Score (OT) 22  -MW       Row Name 11/15/23 1255          Functional Assessment    Outcome Measure Options AM-PAC 6 Clicks Daily Activity (OT)  -               User Key  (r) = Recorded By, (t) = Taken By, (c) = Cosigned By      Initials Name Provider Type    Bertha Escamilla OT Occupational Therapist                    Occupational Therapy Education       Title: PT OT SLP Therapies (Done)       Topic: Occupational Therapy (Done)       Point: ADL training (Done)       Description:   Instruct learner(s) on proper safety adaptation and remediation techniques during self care or transfers.   Instruct in proper use of assistive devices.                  Learning Progress Summary             Patient Acceptance, E,TB,D,H, VU by  at 11/15/2023 1255    Comment: HEP, don/doffing sling, adaptive dressing, precautions   Family Acceptance, E,TB,D,H, VU by  at 11/15/2023 1255    Comment: HEP, don/doffing sling, adaptive dressing, precautions                         Point: Home exercise program (Done)       Description:   Instruct learner(s) on appropriate technique for monitoring, assisting and/or progressing therapeutic exercises/activities.                  Learning Progress Summary             Patient Acceptance, E,TB,D,H, VU by  at 11/15/2023 1255    Comment: HEP, don/doffing sling, adaptive dressing, precautions   Family Acceptance, E,TB,D,H, VU by  at 11/15/2023 1255    Comment: HEP, don/doffing sling, adaptive dressing, precautions                         Point: Precautions (Done)       Description:   Instruct learner(s) on prescribed precautions during self-care and functional transfers.                  Learning Progress Summary             Patient Acceptance, E,TB,D,H, VU by  at 11/15/2023 1255    Comment: HEP, don/doffing sling, adaptive dressing, precautions   Family  Acceptance, E,TB,D,H, VU by  at 11/15/2023 1255    Comment: HEP, don/doffing sling, adaptive dressing, precautions                         Point: Body mechanics (Done)       Description:   Instruct learner(s) on proper positioning and spine alignment during self-care, functional mobility activities and/or exercises.                  Learning Progress Summary             Patient Acceptance, E,TB,D,H, VU by  at 11/15/2023 1255    Comment: HEP, don/doffing sling, adaptive dressing, precautions   Family Acceptance, E,TB,D,H, VU by  at 11/15/2023 1255    Comment: HEP, don/doffing sling, adaptive dressing, precautions                                         User Key       Initials Effective Dates Name Provider Type Discipline     08/20/21 -  Bertha Bae OT Occupational Therapist OT                  OT Recommendation and Plan  Therapy Frequency (OT): evaluation only  Plan of Care Review  Plan of Care Reviewed With: patient, spouse  Progress: no change  Outcome Evaluation: Pt seen in OSC, POD0 for s/p Right reverse total shoulder arthroplasty. RUE in sling, education provided on total shoulder precautions, HEP, don/doffing sling and adaptive dressing during TBD completion. All questions answered and pt verbalized understanding, he plans home with spouse and therapy. Acute OT to sign off.     Time Calculation:   Evaluation Complexity (OT)  Review Occupational Profile/Medical/Therapy History Complexity: brief/low complexity  Clinical Decision Making Complexity (OT): problem focused assessment/low complexity  Overall Complexity of Evaluation (OT): low complexity     Time Calculation- OT       Row Name 11/15/23 1256             Time Calculation- OT    OT Start Time 1231  -MW      OT Stop Time 1244  -MW      OT Time Calculation (min) 13 min  -MW      OT Received On 11/15/23  -MW         Untimed Charges    OT Eval/Re-eval Minutes 13  -MW         Total Minutes    Untimed Charges Total Minutes 13  -MW       Total  Minutes 13  -MW                User Key  (r) = Recorded By, (t) = Taken By, (c) = Cosigned By      Initials Name Provider Type    Bertha Escamilla OT Occupational Therapist                  Therapy Charges for Today       Code Description Service Date Service Provider Modifiers Qty    [id number]  OT EVAL LOW COMPLEXITY 3 11/15/2023 Bertha Bae OT GO 1                 Bertha Bae OT  11/15/2023

## 2023-11-15 NOTE — PLAN OF CARE
Goal Outcome Evaluation:  Plan of Care Reviewed With: patient, spouse        Progress: no change  Outcome Evaluation: Pt seen in OSC, POD0 for s/p Right reverse total shoulder arthroplasty. RUE in sling, education provided on total shoulder precautions, HEP, don/doffing sling and adaptive dressing during TBD completion. All questions answered and pt verbalized understanding, he plans home with spouse and therapy. Acute OT to sign off.      Anticipated Discharge Disposition (OT): home, home with assist, home with outpatient therapy services

## 2023-11-15 NOTE — ANESTHESIA PROCEDURE NOTES
Airway  Urgency: elective    Date/Time: 11/15/2023 7:12 AM  Airway not difficult    General Information and Staff    Patient location during procedure: OR  Anesthesiologist: Palak, Isaac Burden MD  CRNA/CAA: Carol Chiang CRNA    Indications and Patient Condition  Indications for airway management: airway protection    Preoxygenated: yes  Mask difficulty assessment: 1 - vent by mask    Final Airway Details  Final airway type: endotracheal airway      Successful airway: ETT  Cuffed: yes   Successful intubation technique: direct laryngoscopy  Facilitating devices/methods: intubating stylet and anterior pressure/BURP  Endotracheal tube insertion site: oral  Blade: Bella  Blade size: 2  ETT size (mm): 7.5  Cormack-Lehane Classification: grade IIa - partial view of glottis  Placement verified by: chest auscultation and capnometry   Measured from: lips  ETT/EBT  to lips (cm): 23  Number of attempts at approach: 1  Assessment: lips, teeth, and gum same as pre-op and atraumatic intubation

## 2023-11-15 NOTE — ANESTHESIA PROCEDURE NOTES
Peripheral Block    Pre-sedation assessment completed: 11/15/2023 6:15 AM    Patient reassessed immediately prior to procedure    Patient location during procedure: pre-op  Start time: 11/15/2023 6:16 AM  Stop time: 11/15/2023 6:20 AM  Reason for block: at surgeon's request and post-op pain management  Performed by  Anesthesiologist: Isaac Cid MD  Preanesthetic Checklist  Completed: patient identified, IV checked, site marked, risks and benefits discussed, surgical consent, monitors and equipment checked, pre-op evaluation and timeout performed  Prep:  Sterile barriers:gloves  Prep: ChloraPrep  Patient monitoring: blood pressure monitoring, continuous pulse oximetry and EKG  Procedure    Sedation: yes    Guidance:ultrasound guided    ULTRASOUND INTERPRETATION.  Using ultrasound guidance a 22 G gauge needle was placed in close proximity to the brachial plexus nerve, at which point, under ultrasound guidance anesthetic was injected in the area of the nerve and spread of the anesthesia was seen on ultrasound in close proximity thereto.  There were no abnormalities seen on ultrasound; a digital image was taken; and the patient tolerated the procedure with no complications. Images:still images obtained    Laterality:right  Block Type:interscalene  Injection Technique:single-shot  Needle Type:short-bevel  Needle Gauge:22 G      Medications Used: dexamethasone (DECADRON) injection - Injection   4 mg - 11/15/2023 6:16:00 AM  ropivacaine (NAROPIN) 0.5 % injection - Injection   20 mL - 11/15/2023 6:16:00 AM      Medications  Comment:Ultrasound Interpretation:  Using ultrasound guidance the needle was placed in close proximity to the nerve and anesthetic was injected in the area of the nerve and spread of the anesthetic was seen on ultrasound in close proximity thereto.  There were no abnormalities seen on ultrasound; a digital image was taken; and the patient tolerated the procedure with no complications.     .    Post Assessment  Injection Assessment: negative aspiration for heme, no paresthesia on injection and incremental injection  Patient Tolerance:comfortable throughout block  Complications:no

## 2023-11-17 ENCOUNTER — TELEPHONE (OUTPATIENT)
Dept: ORTHOPEDIC SURGERY | Facility: HOSPITAL | Age: 58
End: 2023-11-17
Payer: COMMERCIAL

## 2023-11-17 NOTE — TELEPHONE ENCOUNTER
Post op day 2  Discharge Instructions:  Ask patient about his or her discharge instructions  ?  Patient confirmed understanding   []  Further instruction needed   What, if any, recommendations, teaching, or interventions did you provide? Click or tap here to enter text.  Health status:  Pain controlled Yes   The pain is a lot better today. The pain medication does help.   Recommended interventions:  Yes  incision/dressing status   ?  Clean without redness, drainage, odor  []  Redness    []  Drainage - color Click or tap here to enter text.  []  Odor  DILLAN - Green light blinking Choose an item.  Difficulties urination No  Last BM 11/15/2023 (if no BM by day 3-recommend OTC suppository or fleets enema)  No BM yet, taking the medication. Other options given.   Medications:  ?Medications reviewed with patient/family/caregiver  Patient taking medications as prescribed?   Yes  If not taking medications as prescribed, note specific medicine(s) and reason for each:  Click or tap here to enter text.  Hospital Follow Up Plan:  Follow up Appointment with Orthopedic surgeon:  ?Has f/u appointment                []Scheduled f/u appointment  Home Care ordered at discharge?    No        Home Care started, or contact made?    No   If no, action taken: Total Shoulder   DME obtained/used in home?         Choose an item.   Using IS  Choose an item.   Other information: Mr. Ga said he is doing much better today. Yesterday was rough. The block wore off and he was in quite a bit of pain. This morning when he got up he felt better. He is doing his exercises. Dressing looks good. Pain is tolerable. Things are going really well. Mr. Ga doesn't have any questions for me at this time. he has my contact information should he need anything.

## 2023-11-27 ENCOUNTER — TELEPHONE (OUTPATIENT)
Dept: ORTHOPEDIC SURGERY | Facility: HOSPITAL | Age: 58
End: 2023-11-27
Payer: COMMERCIAL

## 2023-11-27 NOTE — TELEPHONE ENCOUNTER
Called and spoke with Mr. Ga to see how he is doing as he is 2 weeks SP RTSRA. He said he is doing fine. He has his F/U with the MD tomorrow and can't wait. He is doing exercises and getting along pretty good. Pain has eased down quite a bit. Things seem to be progressing well. Mr. Ga doesn't have any questions or concerns for me at this time. He has my contact information should he need anything

## 2024-02-01 ENCOUNTER — OFFICE (AMBULATORY)
Dept: URBAN - METROPOLITAN AREA CLINIC 76 | Facility: CLINIC | Age: 59
End: 2024-02-01

## 2024-02-01 ENCOUNTER — TRANSCRIBE ORDERS (OUTPATIENT)
Dept: ADMINISTRATIVE | Facility: HOSPITAL | Age: 59
End: 2024-02-01
Payer: COMMERCIAL

## 2024-02-01 VITALS
OXYGEN SATURATION: 95 % | DIASTOLIC BLOOD PRESSURE: 84 MMHG | SYSTOLIC BLOOD PRESSURE: 130 MMHG | HEART RATE: 88 BPM | WEIGHT: 204 LBS | HEIGHT: 69 IN

## 2024-02-01 DIAGNOSIS — R13.10 DYSPHAGIA, UNSPECIFIED TYPE: Primary | ICD-10-CM

## 2024-02-01 DIAGNOSIS — R68.81 EARLY SATIETY: ICD-10-CM

## 2024-02-01 DIAGNOSIS — K22.70 BARRETT'S ESOPHAGUS WITHOUT DYSPLASIA: ICD-10-CM

## 2024-02-01 DIAGNOSIS — R13.10 DYSPHAGIA, UNSPECIFIED: ICD-10-CM

## 2024-02-01 DIAGNOSIS — K22.9 DISEASE OF ESOPHAGUS, UNSPECIFIED: ICD-10-CM

## 2024-02-01 PROBLEM — T18.2XXA FOREIGN BODY IN STOMACH, INITIAL ENCOUNTER: Status: ACTIVE | Noted: 2023-10-17

## 2024-02-01 PROCEDURE — 99214 OFFICE O/P EST MOD 30 MIN: CPT | Performed by: NURSE PRACTITIONER

## 2024-02-01 RX ORDER — OMEPRAZOLE 40 MG/1
CAPSULE, DELAYED RELEASE ORAL
Qty: 90 | Refills: 2 | Status: ACTIVE

## 2024-03-12 ENCOUNTER — ANESTHESIA EVENT (OUTPATIENT)
Dept: GASTROENTEROLOGY | Facility: HOSPITAL | Age: 59
End: 2024-03-12
Payer: COMMERCIAL

## 2024-03-13 ENCOUNTER — ON CAMPUS - OUTPATIENT (AMBULATORY)
Dept: URBAN - METROPOLITAN AREA HOSPITAL 85 | Facility: HOSPITAL | Age: 59
End: 2024-03-13
Payer: COMMERCIAL

## 2024-03-13 ENCOUNTER — ANESTHESIA (OUTPATIENT)
Dept: GASTROENTEROLOGY | Facility: HOSPITAL | Age: 59
End: 2024-03-13
Payer: COMMERCIAL

## 2024-03-13 ENCOUNTER — HOSPITAL ENCOUNTER (OUTPATIENT)
Facility: HOSPITAL | Age: 59
Setting detail: HOSPITAL OUTPATIENT SURGERY
Discharge: HOME | End: 2024-03-13
Attending: INTERNAL MEDICINE | Admitting: INTERNAL MEDICINE
Payer: COMMERCIAL

## 2024-03-13 VITALS
BODY MASS INDEX: 29.49 KG/M2 | RESPIRATION RATE: 12 BRPM | HEART RATE: 58 BPM | WEIGHT: 199.08 LBS | TEMPERATURE: 97.9 F | DIASTOLIC BLOOD PRESSURE: 77 MMHG | HEIGHT: 69 IN | SYSTOLIC BLOOD PRESSURE: 122 MMHG | OXYGEN SATURATION: 92 %

## 2024-03-13 DIAGNOSIS — K22.9 DISEASE OF ESOPHAGUS, UNSPECIFIED: ICD-10-CM

## 2024-03-13 DIAGNOSIS — K31.89 OTHER DISEASES OF STOMACH AND DUODENUM: ICD-10-CM

## 2024-03-13 DIAGNOSIS — K86.1 OTHER CHRONIC PANCREATITIS: ICD-10-CM

## 2024-03-13 DIAGNOSIS — K31.A0 GASTRIC INTESTINAL METAPLASIA, UNSPECIFIED: ICD-10-CM

## 2024-03-13 DIAGNOSIS — K22.89 OTHER SPECIFIED DISEASE OF ESOPHAGUS: ICD-10-CM

## 2024-03-13 DIAGNOSIS — K29.50 UNSPECIFIED CHRONIC GASTRITIS WITHOUT BLEEDING: ICD-10-CM

## 2024-03-13 DIAGNOSIS — K22.9 NODULE OF ESOPHAGUS: ICD-10-CM

## 2024-03-13 DIAGNOSIS — K44.9 DIAPHRAGMATIC HERNIA WITHOUT OBSTRUCTION OR GANGRENE: ICD-10-CM

## 2024-03-13 LAB — GLUCOSE BLDC GLUCOMTR-MCNC: 249 MG/DL (ref 70–105)

## 2024-03-13 PROCEDURE — 88305 TISSUE EXAM BY PATHOLOGIST: CPT | Performed by: INTERNAL MEDICINE

## 2024-03-13 PROCEDURE — 25010000002 PROPOFOL 10 MG/ML EMULSION: Performed by: ANESTHESIOLOGIST ASSISTANT

## 2024-03-13 PROCEDURE — 43239 EGD BIOPSY SINGLE/MULTIPLE: CPT | Performed by: INTERNAL MEDICINE

## 2024-03-13 PROCEDURE — 43259 EGD US EXAM DUODENUM/JEJUNUM: CPT | Performed by: INTERNAL MEDICINE

## 2024-03-13 PROCEDURE — 88342 IMHCHEM/IMCYTCHM 1ST ANTB: CPT | Performed by: INTERNAL MEDICINE

## 2024-03-13 PROCEDURE — 25810000003 SODIUM CHLORIDE 0.9 % SOLUTION: Performed by: ANESTHESIOLOGIST ASSISTANT

## 2024-03-13 PROCEDURE — 82948 REAGENT STRIP/BLOOD GLUCOSE: CPT

## 2024-03-13 RX ORDER — SODIUM CHLORIDE 9 MG/ML
INJECTION, SOLUTION INTRAVENOUS CONTINUOUS PRN
Status: DISCONTINUED | OUTPATIENT
Start: 2024-03-13 | End: 2024-03-13 | Stop reason: SURG

## 2024-03-13 RX ADMIN — PROPOFOL 100 MCG/KG/MIN: 10 INJECTION, EMULSION INTRAVENOUS at 09:23

## 2024-03-13 RX ADMIN — SODIUM CHLORIDE: 9 INJECTION, SOLUTION INTRAVENOUS at 09:22

## 2024-03-13 NOTE — DISCHARGE INSTRUCTIONS
A responsible adult should stay with you and you should rest quietly for the rest of the day.    Do not drink alcohol, drive, operate any heavy machinery or power tools or make any legal/important decisions for the next 24 hours.     Progress your diet as tolerated.  If you begin to experience severe pain, increased shortness of breath, racing heartbeat or a fever above 101 F, seek immediate medical attention.     Follow up with MD as instructed. Call office for results in 3 to 5 days if needed.     For further questions please call Dr. Neff's office at [phone]    Recommendations:  Follow-up with Dr. Carrion  Follow the GI clinic as needed.  Continue with the PPI  Follow up with GI clinic as needed  Follow up with PCP as scheduled  Follow up with biopsy report

## 2024-03-13 NOTE — ANESTHESIA PREPROCEDURE EVALUATION
Anesthesia Evaluation     Patient summary reviewed and Nursing notes reviewed   NPO Solid Status: > 8 hours  NPO Liquid Status: > 8 hours           Airway   Mallampati: II  TM distance: >3 FB  Neck ROM: full  Possible difficult intubation  Dental - normal exam     Pulmonary - normal exam    breath sounds clear to auscultation  (+) asthma,sleep apnea  Cardiovascular - normal exam  Exercise tolerance: unable to assess    ECG reviewed  Rhythm: regular  Rate: normal    (+) hypertension, hyperlipidemia      Neuro/Psych- negative ROS  GI/Hepatic/Renal/Endo    (+) diabetes mellitus    Musculoskeletal     Abdominal  - normal exam   Substance History - negative use     OB/GYN negative ob/gyn ROS         Other   arthritis,                 Anesthesia Plan    ASA 2     general     ([employer] - no blood products,?Difficult Airway)  intravenous induction     Anesthetic plan, risks, benefits, and alternatives have been provided, discussed and informed consent has been obtained with: patient.    CODE STATUS:

## 2024-03-13 NOTE — H&P
Patient Care Team:  Wandy Carrion MD as PCP - General (Family Medicine)      Subjective .     History of present illness:    Bay Ga is a 58 y.o. male who presents today for Procedure(s):  ESOPHAGOGASTRODUODENOSCOPY WITH ENDOSCOPIC ULTRASOUND AND BIOPSIES for the indications listed below.     The updated Patient Profile was reviewed prior to the procedure, in conjunction with the Physical Exam, including medical conditions, surgical procedures, medications, allergies, family history and social history.     Pre-operatively, I reviewed the indication(s) for the procedure, the risks of the procedure [including but not limited to: unexpected bleeding possibly requiring hospitalization and/or unplanned repeat procedures, perforation possibly requiring surgical treatment, missed lesions and complications of sedation/MAC (also explained by anesthesia staff)].     I have evaluated the patient for risks associated with the planned anesthesia and the procedure to be performed and find the patient an acceptable candidate for IV sedation.    Multiple opportunities were provided for any questions or concerns, and all questions were answered satisfactorily before any anesthesia was administered. We will proceed with the planned procedure.      ASSESSMENT/PLAN:  Patient with a EGD EUS for abdominal pain      Past Medical History:  Past Medical History:   Diagnosis Date    Anxiety     Arthritis     Asthma     Diabetes mellitus     ED (erectile dysfunction)     History of bronchitis     Hyperlipidemia     Hypertension     Nodule of esophagus     Refusal of blood transfusions as patient is Caodaism     Sleep apnea     not using machine since losing weight but hasn't been retested       Past Surgical History:  Past Surgical History:   Procedure Laterality Date    COLONOSCOPY      ENDOSCOPY      PATELLA BURSA EXCISION Left     knee    SHOULDER ARTHROSCOPY Right     TOTAL SHOULDER ARTHROPLASTY W/ DISTAL CLAVICLE  EXCISION Right 11/15/2023    Procedure: TOTAL SHOULDER REVERSE ARTHROPLASTY;  Surgeon: Marlon Gordon MD;  Location: [facility] [place];  Service: Orthopedics;  Laterality: Right;       Social History:  Social History     Tobacco Use    Smoking status: Former     Current packs/day: 0.00     Average packs/day: 0.5 packs/day for 2.0 years (1.0 ttl pk-yrs)     Types: Cigarettes     Start date:      Quit date:      Years since quittin.2    Smokeless tobacco: Never   Vaping Use    Vaping status: Never Used   Substance Use Topics    Alcohol use: Yes     Alcohol/week: 6.0 standard drinks of alcohol     Types: 6 Shots of liquor per week    Drug use: Not Currently     Types: Marijuana, LSD, Hashish     Comment: none since        Family History:  Family History   Problem Relation Age of Onset    Malig Hyperthermia Neg Hx        Medications:  No medications prior to admission.       Scheduled Meds:  Continuous Infusions:No current facility-administered medications for this encounter.    PRN Meds:.    ALLERGIES:  Aspirin, Cat hair extract, Anacin [aspirin-caffeine], Caffeine, Ibuprofen, and Nsaids        Objective     Vital Signs:   Temp:  [97.9 °F (36.6 °C)] 97.9 °F (36.6 °C)  Heart Rate:  [58-70] 58  Resp:  [12-18] 12  BP: (111-131)/(66-85) 122/77    Physical Exam:      General Appearance:    Awake and alert, in no acute distress   Lungs:     Clear to auscultation bilaterally, respirations regular, even and unlabored    Heart:    Regular rhythm and normal rate, normal S1 and S2, no            murmur, no gallop, no rub   Abdomen:     Normal bowel sounds, soft, non-tender, no rebound or guarding, non-distended, no hepatosplenomegaly        Results Review:   I reviewed the patient's labs and imaging.  Lab Results (last 24 hours)       Procedure Component Value Units Date/Time    POC Glucose Once [[id number]]  (Abnormal) Collected: 24    Specimen: Blood Updated: 24     Glucose 249 mg/dL       Comment: Serial Number: [id number]:  [id number]               Imaging Results (Last 24 Hours)       ** No results found for the last 24 hours. **               I discussed the patients findings and my recommendations with the patient.  Erin Neff MD  03/13/24  17:54 EDT

## 2024-03-13 NOTE — OP NOTE
ESOPHAGOGASTRODUODENOSCOPY WITH ULTRASOUND AND FINE NEEDLE ASPIRATION Procedure Report    Patient Name:  Bay Ga  YOB: 1965    Date of Surgery:  3/13/2024     Pre-Op Diagnosis:  Nodule of esophagus [K22.9]         Procedure/CPT® Codes:      Procedure(s):  ESOPHAGOGASTRODUODENOSCOPY WITH ENDOSCOPIC ULTRASOUND AND BIOPSIES    Staff:  Surgeon(s):  Erin Neff MD      Anesthesia: Monitored Anesthesia Care    Description of Procedure:  A description of the procedure as well as risks, benefits and alternative methods were explained to the patient who voiced understanding and signed the corresponding consent form. A physical exam was performed and vital signs were monitored throughout the procedure.      An upper GI endoscope was placed into the mouth and proceeded through the esophagus, stomach and second portion of the duodenum without difficulty. The scope was then retroflexed and the fundus was visualized. The procedure was not difficult and there were no immediate complications.  Later Pentax radial EUS scope was advanced under direct realization from orifice, esophagus stomach and second part of the duodenum.    EGD findings  Esophageal mucosa looks normal except for very small submucosal lesion at the upper part of the esophagus was noticed around 22 centimeter.  This may be around 6 or 7 mm at the most.  Gastric mucosa looks normal except minimal erythema as well as edema was noticed body in the stomach area biopsy was performed to rule out H. pylori induced gastritis versus atrophic gastritis.  Dual mucosa looks normal first and the second part of the duodenum.    EUS finding  Scanning at the level of submucosal lesion in the upper part of the esophagus did show a small anechoic cystic lesion arising from submucosa, this was anechoic dark lesion consistent with a retention cyst.  This measures at the most 6 mm.  This is benign condition.  Scanning of the body and the head of the pancreas  did show some changes of chronic pancreatitis with few hyperechoic bright foci.  Gallbladder was distended without any stone or any other lesion.  Bile duct was not dilated      Impression:  1.  Submucosal lesion noticed in the upper third of the esophagus is consistent with small retention cyst around 6 mm.  This is benign condition and does not require any further evaluation.  2.  Small hiatal hernia.  3.  Changes consistent with a gastritis biopsy was performed.  3.  Changes suggestive of chronic pancreatitis    Recommendations:  Follow-up with Dr. Carrion  Follow the GI clinic as needed.  Continue with the PPI  Follow up with GI clinic as needed  Follow up with PCP as scheduled  Follow up with biopsy report      Erin Neff MD     Date: 3/13/2024    Time: 09:53 EDT

## 2024-03-13 NOTE — ANESTHESIA POSTPROCEDURE EVALUATION
Patient: Bay Ga    Procedure Summary       Date: 03/13/24 Room / Location: [facility] ENDOSCOPY 2 / [facility] ENDOSCOPY    Anesthesia Start: 0919 Anesthesia Stop: 0952    Procedure: ESOPHAGOGASTRODUODENOSCOPY WITH ENDOSCOPIC ULTRASOUND AND BIOPSIES Diagnosis:       Nodule of esophagus      (Nodule of esophagus [K22.9])    Surgeons: Erin Neff MD Provider: Jose Rausch MD    Anesthesia Type: general ASA Status: 2            Anesthesia Type: general    Vitals  Vitals Value Taken Time   /77 03/13/24 1040   Temp     Pulse 60 03/13/24 1049   Resp 12 03/13/24 1040   SpO2 97 % 03/13/24 1049   Vitals shown include unfiled device data.        Post Anesthesia Care and Evaluation    Patient location during evaluation: PHASE II  Patient participation: complete - patient participated  Level of consciousness: awake  Pain scale: See nurse's notes for pain score.  Pain management: adequate    Airway patency: patent  Anesthetic complications: No anesthetic complications  PONV Status: none  Cardiovascular status: acceptable  Respiratory status: acceptable and spontaneous ventilation  Hydration status: acceptable    Comments: Patient seen and examined postoperatively; vital signs stable; SpO2 greater than or equal to 90%; cardiopulmonary status stable; nausea/vomiting adequately controlled; pain adequately controlled; no apparent anesthesia complications; patient discharged from anesthesia care when discharge criteria were met

## 2024-03-14 LAB
LAB AP CASE REPORT: NORMAL
LAB AP CLINICAL INFORMATION: NORMAL
PATH REPORT.FINAL DX SPEC: NORMAL
PATH REPORT.GROSS SPEC: NORMAL

## 2024-03-25 ENCOUNTER — HOSPITAL ENCOUNTER (OUTPATIENT)
Dept: NUCLEAR MEDICINE | Facility: HOSPITAL | Age: 59
Discharge: HOME | End: 2024-03-25
Payer: COMMERCIAL

## 2024-03-25 DIAGNOSIS — R13.10 DYSPHAGIA, UNSPECIFIED TYPE: ICD-10-CM

## 2024-03-25 DIAGNOSIS — R68.81 EARLY SATIETY: ICD-10-CM

## 2024-03-25 PROCEDURE — 0 TECHNETIUM SULFUR COLLOID: Performed by: NURSE PRACTITIONER

## 2024-03-25 PROCEDURE — A9541 TC99M SULFUR COLLOID: HCPCS | Performed by: NURSE PRACTITIONER

## 2024-03-25 PROCEDURE — 78264 GASTRIC EMPTYING IMG STUDY: CPT

## 2024-03-25 RX ADMIN — TECHNETIUM TC 99M SULFUR COLLOID 1 DOSE: KIT at 07:30

## 2024-05-07 ENCOUNTER — OFFICE (AMBULATORY)
Dept: URBAN - METROPOLITAN AREA CLINIC 76 | Facility: CLINIC | Age: 59
End: 2024-05-07

## 2024-05-07 VITALS
HEART RATE: 83 BPM | HEIGHT: 69 IN | DIASTOLIC BLOOD PRESSURE: 86 MMHG | WEIGHT: 198 LBS | SYSTOLIC BLOOD PRESSURE: 130 MMHG | OXYGEN SATURATION: 96 %

## 2024-05-07 DIAGNOSIS — R11.0 NAUSEA: ICD-10-CM

## 2024-05-07 DIAGNOSIS — K21.9 GASTRO-ESOPHAGEAL REFLUX DISEASE WITHOUT ESOPHAGITIS: ICD-10-CM

## 2024-05-07 DIAGNOSIS — Z86.010 PERSONAL HISTORY OF COLONIC POLYPS: ICD-10-CM

## 2024-05-07 DIAGNOSIS — K22.70 BARRETT'S ESOPHAGUS WITHOUT DYSPLASIA: ICD-10-CM

## 2024-05-07 PROCEDURE — 99214 OFFICE O/P EST MOD 30 MIN: CPT

## (undated) DEVICE — BNDG ELAS CO-FLEX SLF ADHR 6IN 5YD LF STRL

## (undated) DEVICE — PATIENT RETURN ELECTRODE, SINGLE-USE, CONTACT QUALITY MONITORING, ADULT, WITH 9FT CORD, FOR PATIENTS WEIGING OVER 33LBS. (15KG): Brand: MEGADYNE

## (undated) DEVICE — SHEET, DRAPE, SPLIT, STERILE: Brand: MEDLINE

## (undated) DEVICE — HANDPIECE SET WITH COAXIAL HIGH FLOW TIP AND SUCTION TUBE: Brand: INTERPULSE

## (undated) DEVICE — SINGLE-USE BIOPSY FORCEPS: Brand: RADIAL JAW 4

## (undated) DEVICE — SKIN PREP TRAY W/CHG: Brand: MEDLINE INDUSTRIES, INC.

## (undated) DEVICE — DUAL CUT SAGITTAL BLADE

## (undated) DEVICE — BITEBLOCK ENDO W/STRAP 60F A/ LF DISP

## (undated) DEVICE — PK SHLDR OPN 40

## (undated) DEVICE — BIT DRL SCRW PERIPH 2.7MM

## (undated) DEVICE — MAT FLR ABSORBENT LG 4FT 10 2.5FT

## (undated) DEVICE — GLV SURG BIOGEL LTX PF 8

## (undated) DEVICE — SUT VIC 0 CT1 36IN J946H

## (undated) DEVICE — GLV SURG SIGNATURE ESSENTIAL PF LTX SZ8

## (undated) DEVICE — PK ENDO GI 50

## (undated) DEVICE — IMPLANTABLE DEVICE
Type: IMPLANTABLE DEVICE | Site: SHOULDER | Status: NON-FUNCTIONAL
Brand: COMPREHENSIVE® REVERSE SHOULDER
Removed: 2023-11-15

## (undated) DEVICE — DRSNG GZ PETROLTM XEROFORM CURAD 1X8IN STRL

## (undated) DEVICE — IMMOB SHLDR PAD2 UNIV LG

## (undated) DEVICE — ZIP 16 SURGICAL SKIN CLOSURE DEVICE, PSA: Brand: ZIP 16 SURGICAL SKIN CLOSURE DEVICE